# Patient Record
Sex: FEMALE | Race: WHITE | NOT HISPANIC OR LATINO
[De-identification: names, ages, dates, MRNs, and addresses within clinical notes are randomized per-mention and may not be internally consistent; named-entity substitution may affect disease eponyms.]

---

## 2017-08-14 ENCOUNTER — APPOINTMENT (OUTPATIENT)
Dept: SURGERY | Facility: CLINIC | Age: 47
End: 2017-08-14
Payer: COMMERCIAL

## 2017-08-14 PROCEDURE — 99205K: CUSTOM

## 2017-08-16 ENCOUNTER — OUTPATIENT (OUTPATIENT)
Dept: OUTPATIENT SERVICES | Facility: HOSPITAL | Age: 47
LOS: 1 days | End: 2017-08-16
Payer: COMMERCIAL

## 2017-08-16 ENCOUNTER — RESULT REVIEW (OUTPATIENT)
Age: 47
End: 2017-08-16

## 2017-08-16 DIAGNOSIS — Z98.89 OTHER SPECIFIED POSTPROCEDURAL STATES: Chronic | ICD-10-CM

## 2017-08-16 DIAGNOSIS — N92.6 IRREGULAR MENSTRUATION, UNSPECIFIED: Chronic | ICD-10-CM

## 2017-08-16 PROCEDURE — 88321 CONSLTJ&REPRT SLD PREP ELSWR: CPT

## 2017-08-17 ENCOUNTER — OUTPATIENT (OUTPATIENT)
Dept: OUTPATIENT SERVICES | Facility: HOSPITAL | Age: 47
LOS: 1 days | End: 2017-08-17
Payer: COMMERCIAL

## 2017-08-17 ENCOUNTER — APPOINTMENT (OUTPATIENT)
Dept: ULTRASOUND IMAGING | Facility: CLINIC | Age: 47
End: 2017-08-17

## 2017-08-17 ENCOUNTER — APPOINTMENT (OUTPATIENT)
Dept: MRI IMAGING | Facility: CLINIC | Age: 47
End: 2017-08-17

## 2017-08-17 ENCOUNTER — APPOINTMENT (OUTPATIENT)
Dept: MAMMOGRAPHY | Facility: CLINIC | Age: 47
End: 2017-08-17

## 2017-08-17 ENCOUNTER — RESULT REVIEW (OUTPATIENT)
Age: 47
End: 2017-08-17

## 2017-08-17 DIAGNOSIS — N92.6 IRREGULAR MENSTRUATION, UNSPECIFIED: Chronic | ICD-10-CM

## 2017-08-17 DIAGNOSIS — Z98.89 OTHER SPECIFIED POSTPROCEDURAL STATES: Chronic | ICD-10-CM

## 2017-08-17 DIAGNOSIS — Z00.8 ENCOUNTER FOR OTHER GENERAL EXAMINATION: ICD-10-CM

## 2017-08-17 PROCEDURE — 76642 ULTRASOUND BREAST LIMITED: CPT

## 2017-08-17 PROCEDURE — G0279: CPT | Mod: 26

## 2017-08-17 PROCEDURE — 77065 DX MAMMO INCL CAD UNI: CPT

## 2017-08-17 PROCEDURE — G0204: CPT | Mod: 26

## 2017-08-17 PROCEDURE — 76642 ULTRASOUND BREAST LIMITED: CPT | Mod: 26,RT

## 2017-08-17 PROCEDURE — A9585: CPT

## 2017-08-17 PROCEDURE — 19085 BX BREAST 1ST LESION MR IMAG: CPT | Mod: LT

## 2017-08-17 PROCEDURE — 77066 DX MAMMO INCL CAD BI: CPT

## 2017-08-17 PROCEDURE — G0279: CPT

## 2017-08-17 PROCEDURE — A4648: CPT

## 2017-08-17 PROCEDURE — 19085 BX BREAST 1ST LESION MR IMAG: CPT

## 2017-08-21 LAB — SURGICAL PATHOLOGY STUDY: SIGNIFICANT CHANGE UP

## 2017-08-23 DIAGNOSIS — N64.89 OTHER SPECIFIED DISORDERS OF BREAST: ICD-10-CM

## 2017-08-23 DIAGNOSIS — D05.11 INTRADUCTAL CARCINOMA IN SITU OF RIGHT BREAST: ICD-10-CM

## 2017-08-23 DIAGNOSIS — R92.8 OTHER ABNORMAL AND INCONCLUSIVE FINDINGS ON DIAGNOSTIC IMAGING OF BREAST: ICD-10-CM

## 2017-09-14 ENCOUNTER — APPOINTMENT (OUTPATIENT)
Dept: PLASTIC SURGERY | Facility: CLINIC | Age: 47
End: 2017-09-14
Payer: COMMERCIAL

## 2017-09-14 VITALS
RESPIRATION RATE: 16 BRPM | BODY MASS INDEX: 21.19 KG/M2 | TEMPERATURE: 97.8 F | OXYGEN SATURATION: 100 % | WEIGHT: 135 LBS | HEIGHT: 67 IN

## 2017-09-14 DIAGNOSIS — I48.91 UNSPECIFIED ATRIAL FIBRILLATION: ICD-10-CM

## 2017-09-14 DIAGNOSIS — D05.11 INTRADUCTAL CARCINOMA IN SITU OF RIGHT BREAST: ICD-10-CM

## 2017-09-14 DIAGNOSIS — Z86.018 PERSONAL HISTORY OF OTHER BENIGN NEOPLASM: ICD-10-CM

## 2017-09-14 PROCEDURE — 99024 POSTOP FOLLOW-UP VISIT: CPT

## 2017-09-14 PROCEDURE — 99245 OFF/OP CONSLTJ NEW/EST HI 55: CPT

## 2017-09-19 ENCOUNTER — OUTPATIENT (OUTPATIENT)
Dept: OUTPATIENT SERVICES | Facility: HOSPITAL | Age: 47
LOS: 1 days | End: 2017-09-19
Payer: COMMERCIAL

## 2017-09-19 VITALS
DIASTOLIC BLOOD PRESSURE: 60 MMHG | HEART RATE: 56 BPM | HEIGHT: 66.75 IN | SYSTOLIC BLOOD PRESSURE: 100 MMHG | WEIGHT: 138.89 LBS | TEMPERATURE: 97 F | RESPIRATION RATE: 14 BRPM

## 2017-09-19 DIAGNOSIS — N92.6 IRREGULAR MENSTRUATION, UNSPECIFIED: Chronic | ICD-10-CM

## 2017-09-19 DIAGNOSIS — Z98.89 OTHER SPECIFIED POSTPROCEDURAL STATES: Chronic | ICD-10-CM

## 2017-09-19 DIAGNOSIS — Z01.818 ENCOUNTER FOR OTHER PREPROCEDURAL EXAMINATION: ICD-10-CM

## 2017-09-19 DIAGNOSIS — D05.90 UNSPECIFIED TYPE OF CARCINOMA IN SITU OF UNSPECIFIED BREAST: ICD-10-CM

## 2017-09-19 DIAGNOSIS — I48.91 UNSPECIFIED ATRIAL FIBRILLATION: ICD-10-CM

## 2017-09-19 DIAGNOSIS — Z90.710 ACQUIRED ABSENCE OF BOTH CERVIX AND UTERUS: Chronic | ICD-10-CM

## 2017-09-19 LAB
ALBUMIN SERPL ELPH-MCNC: 4.3 G/DL — SIGNIFICANT CHANGE UP (ref 3.3–5)
ALP SERPL-CCNC: 30 U/L — LOW (ref 40–120)
ALT FLD-CCNC: 16 U/L — SIGNIFICANT CHANGE UP (ref 4–33)
AST SERPL-CCNC: 19 U/L — SIGNIFICANT CHANGE UP (ref 4–32)
BILIRUB SERPL-MCNC: 0.3 MG/DL — SIGNIFICANT CHANGE UP (ref 0.2–1.2)
BLD GP AB SCN SERPL QL: NEGATIVE — SIGNIFICANT CHANGE UP
BUN SERPL-MCNC: 16 MG/DL — SIGNIFICANT CHANGE UP (ref 7–23)
CALCIUM SERPL-MCNC: 9.4 MG/DL — SIGNIFICANT CHANGE UP (ref 8.4–10.5)
CHLORIDE SERPL-SCNC: 102 MMOL/L — SIGNIFICANT CHANGE UP (ref 98–107)
CO2 SERPL-SCNC: 25 MMOL/L — SIGNIFICANT CHANGE UP (ref 22–31)
CREAT SERPL-MCNC: 0.56 MG/DL — SIGNIFICANT CHANGE UP (ref 0.5–1.3)
GLUCOSE SERPL-MCNC: 83 MG/DL — SIGNIFICANT CHANGE UP (ref 70–99)
HCT VFR BLD CALC: 39.1 % — SIGNIFICANT CHANGE UP (ref 34.5–45)
HGB BLD-MCNC: 12.8 G/DL — SIGNIFICANT CHANGE UP (ref 11.5–15.5)
MCHC RBC-ENTMCNC: 31.6 PG — SIGNIFICANT CHANGE UP (ref 27–34)
MCHC RBC-ENTMCNC: 32.7 % — SIGNIFICANT CHANGE UP (ref 32–36)
MCV RBC AUTO: 96.5 FL — SIGNIFICANT CHANGE UP (ref 80–100)
NRBC # FLD: 0 — SIGNIFICANT CHANGE UP
PLATELET # BLD AUTO: 221 K/UL — SIGNIFICANT CHANGE UP (ref 150–400)
PMV BLD: 11 FL — SIGNIFICANT CHANGE UP (ref 7–13)
POTASSIUM SERPL-MCNC: 4.6 MMOL/L — SIGNIFICANT CHANGE UP (ref 3.5–5.3)
POTASSIUM SERPL-SCNC: 4.6 MMOL/L — SIGNIFICANT CHANGE UP (ref 3.5–5.3)
PROT SERPL-MCNC: 7.3 G/DL — SIGNIFICANT CHANGE UP (ref 6–8.3)
RBC # BLD: 4.05 M/UL — SIGNIFICANT CHANGE UP (ref 3.8–5.2)
RBC # FLD: 12.7 % — SIGNIFICANT CHANGE UP (ref 10.3–14.5)
RH IG SCN BLD-IMP: POSITIVE — SIGNIFICANT CHANGE UP
SODIUM SERPL-SCNC: 141 MMOL/L — SIGNIFICANT CHANGE UP (ref 135–145)
WBC # BLD: 5.73 K/UL — SIGNIFICANT CHANGE UP (ref 3.8–10.5)
WBC # FLD AUTO: 5.73 K/UL — SIGNIFICANT CHANGE UP (ref 3.8–10.5)

## 2017-09-19 PROCEDURE — 71020: CPT | Mod: 26

## 2017-09-19 NOTE — H&P PST ADULT - PROBLEM SELECTOR PLAN 1
Pt is scheduled for bilateral mastectomy sentinel lymph node biopsy, possible dissections, bilateral muscle flap, bilateral axillary closure and bilateral alloderm for 10/3/17. Preop instructions, surgical scrub and Pepcid provided. Pt stated understanding.

## 2017-09-19 NOTE — H&P PST ADULT - NEGATIVE CARDIOVASCULAR SYMPTOMS
no dyspnea on exertion/no chest pain/no peripheral edema/no paroxysmal nocturnal dyspnea/no claudication/no palpitations/no orthopnea

## 2017-09-19 NOTE — H&P PST ADULT - NEGATIVE GENERAL GENITOURINARY SYMPTOMS
no dysuria/no incontinence/no hematuria/no renal colic/no urinary hesitancy/no flank pain L/normal urinary frequency/no flank pain R/no nocturia/no bladder infections

## 2017-09-19 NOTE — H&P PST ADULT - NEGATIVE GENERAL SYMPTOMS
no anorexia/no chills/no fever/no malaise/no polyphagia/no polyuria/no weight loss/no sweating/no polydipsia/no fatigue/no weight gain

## 2017-09-19 NOTE — H&P PST ADULT - NSANTHOSAYNRD_GEN_A_CORE
No. DIANNA screening performed.  STOP BANG Legend: 0-2 = LOW Risk; 3-4 = INTERMEDIATE Risk; 5-8 = HIGH Risk

## 2017-09-19 NOTE — H&P PST ADULT - NEGATIVE OPHTHALMOLOGIC SYMPTOMS
no blurred vision R/no discharge L/no diplopia/no pain L/no loss of vision R/no blurred vision L/no discharge R/no pain R/no loss of vision L

## 2017-09-19 NOTE — H&P PST ADULT - NEGATIVE NEUROLOGICAL SYMPTOMS
no confusion/no headache/no tremors/no difficulty walking/no loss of sensation/no facial palsy/no focal seizures/no generalized seizures/no paresthesias/no transient paralysis/no vertigo/no weakness/no syncope

## 2017-09-19 NOTE — H&P PST ADULT - MUSCULOSKELETAL
details… detailed exam no joint warmth/ROM intact/normal strength/no joint swelling/no calf tenderness/no joint erythema

## 2017-09-19 NOTE — H&P PST ADULT - NEGATIVE ENMT SYMPTOMS
no throat pain/no dysphagia/no ear pain/no tinnitus/no nasal congestion/no nasal discharge/no vertigo/no nasal obstruction/no post-nasal discharge/no hearing difficulty/no nose bleeds/no sinus symptoms

## 2017-09-19 NOTE — H&P PST ADULT - PMH
Afib  paroxismal A fib  Intramural leiomyoma of uterus    Unspecified type of carcinoma in situ of unspecified breast

## 2017-09-19 NOTE — H&P PST ADULT - PSH
H/O prior ablation treatment  cardiac ablation 2005  H/O: hysterectomy  partial 2015  Irregular uterine bleeding  uterine cauterization 2011  S/P  section  2008

## 2017-09-19 NOTE — H&P PST ADULT - HISTORY OF PRESENT ILLNESS
47 year old female presents to presurgical testing with diagnosis of unspecified type of carcinoma in situ of unspecified breast scheduled for bilateral mastectomy sentinel lymph node biopsy, possible dissections, bilateral muscle flap, bilateral axillary closure and bilateral alloderm for 10/3/17. Pt reports abnormal routine mammogram which lead to further testing. S/p abnormal sonogram followed by bilateral breast biopsy. Decided to go forward with bilateral mastectomies. Pt denies breast lumps, pain, or nipple discharge.

## 2017-09-19 NOTE — H&P PST ADULT - RS GEN PE MLT RESP DETAILS PC
no chest wall tenderness/respirations non-labored/no rhonchi/no subcutaneous emphysema/no intercostal retractions/no rales/clear to auscultation bilaterally/breath sounds equal/airway patent/no wheezes/good air movement

## 2017-10-03 ENCOUNTER — RESULT REVIEW (OUTPATIENT)
Age: 47
End: 2017-10-03

## 2017-10-03 ENCOUNTER — APPOINTMENT (OUTPATIENT)
Dept: SURGERY | Facility: HOSPITAL | Age: 47
End: 2017-10-03

## 2017-10-03 ENCOUNTER — INPATIENT (INPATIENT)
Facility: HOSPITAL | Age: 47
LOS: 0 days | Discharge: HOME | End: 2017-10-04
Attending: SURGERY | Admitting: SURGERY
Payer: COMMERCIAL

## 2017-10-03 ENCOUNTER — APPOINTMENT (OUTPATIENT)
Dept: NUCLEAR MEDICINE | Facility: HOSPITAL | Age: 47
End: 2017-10-03

## 2017-10-03 VITALS
TEMPERATURE: 98 F | OXYGEN SATURATION: 98 % | HEIGHT: 66.75 IN | DIASTOLIC BLOOD PRESSURE: 55 MMHG | WEIGHT: 138.89 LBS | HEART RATE: 57 BPM | RESPIRATION RATE: 16 BRPM | SYSTOLIC BLOOD PRESSURE: 116 MMHG

## 2017-10-03 DIAGNOSIS — Z98.89 OTHER SPECIFIED POSTPROCEDURAL STATES: Chronic | ICD-10-CM

## 2017-10-03 DIAGNOSIS — D05.90 UNSPECIFIED TYPE OF CARCINOMA IN SITU OF UNSPECIFIED BREAST: ICD-10-CM

## 2017-10-03 DIAGNOSIS — Z90.710 ACQUIRED ABSENCE OF BOTH CERVIX AND UTERUS: Chronic | ICD-10-CM

## 2017-10-03 DIAGNOSIS — N92.6 IRREGULAR MENSTRUATION, UNSPECIFIED: Chronic | ICD-10-CM

## 2017-10-03 PROCEDURE — 38525K: CUSTOM | Mod: 50

## 2017-10-03 PROCEDURE — 12034 INTMD RPR S/TR/EXT 7.6-12.5: CPT | Mod: 59

## 2017-10-03 PROCEDURE — 19303K: CUSTOM | Mod: 50

## 2017-10-03 PROCEDURE — 15734 MUSCLE-SKIN GRAFT TRUNK: CPT | Mod: 59,RT

## 2017-10-03 PROCEDURE — 38792K: CUSTOM | Mod: 50

## 2017-10-03 PROCEDURE — 15777 ACELLULAR DERM MATRIX IMPLT: CPT | Mod: LT

## 2017-10-03 PROCEDURE — 88307 TISSUE EXAM BY PATHOLOGIST: CPT | Mod: 26

## 2017-10-03 PROCEDURE — 19357 TISS XPNDR PLMT BRST RCNSTJ: CPT | Mod: RT

## 2017-10-03 PROCEDURE — 88331 PATH CONSLTJ SURG 1 BLK 1SPC: CPT | Mod: 26

## 2017-10-03 RX ORDER — ACETAMINOPHEN 500 MG
650 TABLET ORAL EVERY 6 HOURS
Qty: 0 | Refills: 0 | Status: DISCONTINUED | OUTPATIENT
Start: 2017-10-03 | End: 2017-10-04

## 2017-10-03 RX ORDER — ONDANSETRON 8 MG/1
4 TABLET, FILM COATED ORAL EVERY 6 HOURS
Qty: 0 | Refills: 0 | Status: DISCONTINUED | OUTPATIENT
Start: 2017-10-03 | End: 2017-10-04

## 2017-10-03 RX ORDER — ONDANSETRON 8 MG/1
4 TABLET, FILM COATED ORAL
Qty: 0 | Refills: 0 | Status: DISCONTINUED | OUTPATIENT
Start: 2017-10-03 | End: 2017-10-03

## 2017-10-03 RX ORDER — HYDROMORPHONE HYDROCHLORIDE 2 MG/ML
0.5 INJECTION INTRAMUSCULAR; INTRAVENOUS; SUBCUTANEOUS
Qty: 0 | Refills: 0 | Status: DISCONTINUED | OUTPATIENT
Start: 2017-10-03 | End: 2017-10-04

## 2017-10-03 RX ORDER — DIAZEPAM 5 MG
5 TABLET ORAL AT BEDTIME
Qty: 0 | Refills: 0 | Status: DISCONTINUED | OUTPATIENT
Start: 2017-10-03 | End: 2017-10-04

## 2017-10-03 RX ORDER — FENTANYL CITRATE 50 UG/ML
50 INJECTION INTRAVENOUS
Qty: 0 | Refills: 0 | Status: DISCONTINUED | OUTPATIENT
Start: 2017-10-03 | End: 2017-10-03

## 2017-10-03 RX ORDER — SODIUM CHLORIDE 9 MG/ML
1000 INJECTION, SOLUTION INTRAVENOUS
Qty: 0 | Refills: 0 | Status: DISCONTINUED | OUTPATIENT
Start: 2017-10-03 | End: 2017-10-03

## 2017-10-03 RX ORDER — SOTALOL HCL 120 MG
160 TABLET ORAL
Qty: 0 | Refills: 0 | Status: DISCONTINUED | OUTPATIENT
Start: 2017-10-03 | End: 2017-10-04

## 2017-10-03 RX ORDER — ACETAMINOPHEN 500 MG
650 TABLET ORAL EVERY 6 HOURS
Qty: 0 | Refills: 0 | Status: DISCONTINUED | OUTPATIENT
Start: 2017-10-03 | End: 2017-10-03

## 2017-10-03 RX ORDER — SODIUM CHLORIDE 9 MG/ML
1000 INJECTION INTRAMUSCULAR; INTRAVENOUS; SUBCUTANEOUS
Qty: 0 | Refills: 0 | Status: DISCONTINUED | OUTPATIENT
Start: 2017-10-03 | End: 2017-10-04

## 2017-10-03 RX ORDER — SOTALOL HCL 120 MG
80 TABLET ORAL
Qty: 0 | Refills: 0 | Status: DISCONTINUED | OUTPATIENT
Start: 2017-10-03 | End: 2017-10-04

## 2017-10-03 RX ORDER — DOCUSATE SODIUM 100 MG
100 CAPSULE ORAL
Qty: 0 | Refills: 0 | Status: DISCONTINUED | OUTPATIENT
Start: 2017-10-03 | End: 2017-10-04

## 2017-10-03 RX ORDER — OXYCODONE HYDROCHLORIDE 5 MG/1
5 TABLET ORAL EVERY 6 HOURS
Qty: 0 | Refills: 0 | Status: DISCONTINUED | OUTPATIENT
Start: 2017-10-03 | End: 2017-10-04

## 2017-10-03 RX ORDER — SODIUM CHLORIDE 9 MG/ML
1 INJECTION INTRAMUSCULAR; INTRAVENOUS; SUBCUTANEOUS
Qty: 0 | Refills: 0 | Status: DISCONTINUED | OUTPATIENT
Start: 2017-10-03 | End: 2017-10-04

## 2017-10-03 RX ORDER — CEFAZOLIN SODIUM 1 G
1000 VIAL (EA) INJECTION EVERY 8 HOURS
Qty: 0 | Refills: 0 | Status: DISCONTINUED | OUTPATIENT
Start: 2017-10-03 | End: 2017-10-04

## 2017-10-03 RX ADMIN — SODIUM CHLORIDE 100 MILLILITER(S): 9 INJECTION INTRAMUSCULAR; INTRAVENOUS; SUBCUTANEOUS at 13:00

## 2017-10-03 RX ADMIN — SODIUM CHLORIDE 1 GRAM(S): 9 INJECTION INTRAMUSCULAR; INTRAVENOUS; SUBCUTANEOUS at 18:02

## 2017-10-03 RX ADMIN — Medication 100 MILLIGRAM(S): at 18:02

## 2017-10-03 RX ADMIN — Medication 80 MILLIGRAM(S): at 21:56

## 2017-10-03 RX ADMIN — Medication 650 MILLIGRAM(S): at 18:02

## 2017-10-03 RX ADMIN — Medication 5 MILLIGRAM(S): at 20:38

## 2017-10-03 RX ADMIN — Medication 100 MILLIGRAM(S): at 21:56

## 2017-10-03 NOTE — BRIEF OPERATIVE NOTE - POST-OP DX
Malignant neoplasm of female breast, unspecified estrogen receptor status, unspecified laterality, unspecified site of breast  10/03/2017    Active  Luigi Peterson

## 2017-10-03 NOTE — BRIEF OPERATIVE NOTE - PROCEDURE
<<-----Click on this checkbox to enter Procedure Breast reconstruction  10/03/2017  Bilateral tissue expanders. R - 350ml, L - 300ml. Submuscular with alloderm on the left only.  Active  VVASIL

## 2017-10-03 NOTE — PROGRESS NOTE ADULT - SUBJECTIVE AND OBJECTIVE BOX
D Team Surgery Post Op Note     SUBJECTIVE:    Pt is a 47 year old female seen s/p HASEEB masectomies, tissue expanders bilaterally with reconstruction (submuscular w/ alloderm on left)    SOB:  [ ] YES [ ] NO  Chest Discomfort: [ ] YES [ ] NO    Nausea: [ ] YES [ ] NO           Vomiting: [ ] YES [ ] NO  Flatus: [ ] YES [ ] NO             Bowel Movement: [ ] YES [ ] NO  Diarrhea: [ ] YES [ ] NO         Void: [ ]YES [ ]No  Constipation: [ ] YES [ ] NO     Pain (0-10):              Pain Control Adequate: [ ] YES [ ] NO    Vital Signs Last 24 Hrs  T(C): 36.3 (03 Oct 2017 15:58), Max: 37 (03 Oct 2017 12:35)  T(F): 97.3 (03 Oct 2017 15:58), Max: 98.6 (03 Oct 2017 12:35)  HR: 67 (03 Oct 2017 15:58) (52 - 110)  BP: 111/60 (03 Oct 2017 15:58) (91/47 - 116/55)  BP(mean): --  RR: 15 (03 Oct 2017 15:58) (10 - 20)  SpO2: 99% (03 Oct 2017 15:58) (95% - 100%)  [place]:  NGT:  I&O's Summary    03 Oct 2017 07:01  -  03 Oct 2017 16:19  --------------------------------------------------------  IN: 440 mL / OUT: 75 mL / NET: 365 mL      I&O's Detail    03 Oct 2017 07:01  -  03 Oct 2017 16:19  --------------------------------------------------------  IN:    Oral Fluid: 240 mL    sodium chloride 0.9%.: 200 mL  Total IN: 440 mL    OUT:    Bulb: 50 mL    Bulb: 25 mL  Total OUT: 75 mL    Total NET: 365 mL          PHYSICAL EXAM:  Constitutional: Patient well nourish. well developed.  Eyes:  PERRL, EOMI, Conjunctiva clear.  ENMT:  WNL  Neck:  Supple.  Respiratory:  Lungs CTA, B/L, no rales , no wheezing, no rhonchi.  Cardiovascular:  S1, S2, RRR  Gastrointestinal: Abdomen soft, non distended, + BS, non tenderness  Wound: C/D/I  Genitourinary:  Normal.  Rectal:   Extremities:  No edema, no calf tenderrness,  Neurological: AxAxOx3        A/P:    -Advance Diet as tolerated  -Continue IVF until tolerating diet  -continue Ancef   -resume Sotalol for A-fib   -DVT ppx: venodynes   - D Team Surgery Post Op Note     SUBJECTIVE:    Pt is a 47 year old female seen s/p HASEEB masectomies, tissue expanders bilaterally with reconstruction (submuscular w/ alloderm on left)    Pt seen and examined at bedside. Pt reports come nausea now improved without intervention and pain at incisions. HR controlled at  since surgery.   Pt denies any complaints at this time.   Denies chest pain, SOB, abdominal pain, vomiting, headache dizziness, fever, chills.     Vital Signs Last 24 Hrs  T(C): 36.3 (03 Oct 2017 15:58), Max: 37 (03 Oct 2017 12:35)  T(F): 97.3 (03 Oct 2017 15:58), Max: 98.6 (03 Oct 2017 12:35)  HR: 67 (03 Oct 2017 15:58) (52 - 110)  BP: 111/60 (03 Oct 2017 15:58) (91/47 - 116/55)  BP(mean): --  RR: 15 (03 Oct 2017 15:58) (10 - 20)  SpO2: 99% (03 Oct 2017 15:58) (95% - 100%)    I&O's Summary    03 Oct 2017 07:01  -  03 Oct 2017 16:19  --------------------------------------------------------  IN: 440 mL / OUT: 75 mL / NET: 365 mL      I&O's Detail    03 Oct 2017 07:01  -  03 Oct 2017 16:19  --------------------------------------------------------  IN:    Oral Fluid: 240 mL    sodium chloride 0.9%.: 200 mL  Total IN: 440 mL    OUT:    Bulb: 50 mL serosanguinous     Bulb: 25 mL serosanguinous   Total OUT: 75 mL    Total NET: 365 mL    PHYSICAL EXAM:  Constitutional: Patient well nourish. well developed.  Eyes:  PERRL, EOMI, Conjunctiva clear.  ENMT:  WNL  Cardiovascular:  S1, S2, RRR  Chest: HASEEB breast soft, horizontal incisions with steri-strips C/D/I, left breast appears more pale   Gastrointestinal: Abdomen soft, non distended, + BS, non tenderness  Wound: C/D/I  Genitourinary:  Normal.  Rectal:   Extremities:  No edema, no calf tenderness  Neurological: AxAxOx3    A/P:    -Advance Diet as tolerated  -Continue IVF until tolerating diet  -continue Ancef   -resume Sotalol for A-fib   -DVT ppx: venodynes   - D Team Surgery Post Op Note     SUBJECTIVE:    Pt is a 47 year old female seen s/p HASEEB masectomies, tissue expanders bilaterally with reconstruction (submuscular w/ alloderm on left)    Pt seen and examined at bedside. Pt reports come nausea now improved without intervention and pain at incisions. HR controlled at  since surgery.   Pt denies any complaints at this time.   Denies chest pain, SOB, abdominal pain, vomiting, headache dizziness, fever, chills.     Vital Signs Last 24 Hrs  T(C): 36.3 (03 Oct 2017 15:58), Max: 37 (03 Oct 2017 12:35)  T(F): 97.3 (03 Oct 2017 15:58), Max: 98.6 (03 Oct 2017 12:35)  HR: 67 (03 Oct 2017 15:58) (52 - 110)  BP: 111/60 (03 Oct 2017 15:58) (91/47 - 116/55)  BP(mean): --  RR: 15 (03 Oct 2017 15:58) (10 - 20)  SpO2: 99% (03 Oct 2017 15:58) (95% - 100%)    I&O's Summary    03 Oct 2017 07:01  -  03 Oct 2017 16:19  --------------------------------------------------------  IN: 440 mL / OUT: 75 mL / NET: 365 mL      I&O's Detail    03 Oct 2017 07:01  -  03 Oct 2017 16:19  --------------------------------------------------------  IN:    Oral Fluid: 240 mL    sodium chloride 0.9%.: 200 mL  Total IN: 440 mL    OUT:    Bulb: 50 mL serosanguinous     Bulb: 25 mL serosanguinous   Total OUT: 75 mL    Total NET: 365 mL    PHYSICAL EXAM:  Constitutional: Patient well nourish. well developed.  Eyes:  PERRL, EOMI, Conjunctiva clear.  ENMT:  WNL  Cardiovascular:  S1, S2, RRR  Chest: HASEEB breast soft, horizontal incisions with steri-strips C/D/I, left breast appears more pale with mild ecchymosis. appropriate tenderness to palpation. No axillary or incisional hematomas noted. HASEEB axilla soft nttp   Gastrointestinal: Abdomen soft, non distended, + BS, non tenderness  Extremities:  No edema, no calf tenderness    A/P:    Pt is a 47 year old female seen s/p HASEEB masectomies, tissue expanders bilaterally with reconstruction (submuscular w/ alloderm on left)    -Advance Diet as tolerated  -Continue IVF until tolerating diet  -continue Ancef   -resume Sotalol for A-fib   -DVT ppx: venodynes   -d/c plan for tomorrow   -Case discussed with plastic surgery

## 2017-10-03 NOTE — PATIENT PROFILE ADULT. - PSH
H/O prior ablation treatment  cardiac ablation 2005  Irregular uterine bleeding  uterine cauterization 2011  S/P  section  2008

## 2017-10-04 ENCOUNTER — TRANSCRIPTION ENCOUNTER (OUTPATIENT)
Age: 47
End: 2017-10-04

## 2017-10-04 VITALS
RESPIRATION RATE: 18 BRPM | TEMPERATURE: 98 F | SYSTOLIC BLOOD PRESSURE: 90 MMHG | OXYGEN SATURATION: 95 % | HEART RATE: 77 BPM | DIASTOLIC BLOOD PRESSURE: 49 MMHG

## 2017-10-04 RX ORDER — DOCUSATE SODIUM 100 MG
1 CAPSULE ORAL
Qty: 14 | Refills: 0 | OUTPATIENT
Start: 2017-10-04 | End: 2017-10-11

## 2017-10-04 RX ORDER — AZTREONAM 2 G
1 VIAL (EA) INJECTION
Qty: 14 | Refills: 0 | OUTPATIENT
Start: 2017-10-04 | End: 2017-10-11

## 2017-10-04 RX ORDER — ACETAMINOPHEN 500 MG
1000 TABLET ORAL ONCE
Qty: 0 | Refills: 0 | Status: COMPLETED | OUTPATIENT
Start: 2017-10-04 | End: 2017-10-04

## 2017-10-04 RX ORDER — SODIUM CHLORIDE 9 MG/ML
3 INJECTION INTRAMUSCULAR; INTRAVENOUS; SUBCUTANEOUS EVERY 8 HOURS
Qty: 0 | Refills: 0 | Status: DISCONTINUED | OUTPATIENT
Start: 2017-10-04 | End: 2017-10-04

## 2017-10-04 RX ORDER — DIAZEPAM 5 MG
1 TABLET ORAL
Qty: 24 | Refills: 0 | OUTPATIENT
Start: 2017-10-04 | End: 2017-10-10

## 2017-10-04 RX ADMIN — Medication 650 MILLIGRAM(S): at 06:04

## 2017-10-04 RX ADMIN — Medication 400 MILLIGRAM(S): at 00:12

## 2017-10-04 RX ADMIN — SODIUM CHLORIDE 3 MILLILITER(S): 9 INJECTION INTRAMUSCULAR; INTRAVENOUS; SUBCUTANEOUS at 14:10

## 2017-10-04 RX ADMIN — Medication 160 MILLIGRAM(S): at 09:16

## 2017-10-04 RX ADMIN — Medication 100 MILLIGRAM(S): at 06:03

## 2017-10-04 RX ADMIN — Medication 1000 MILLIGRAM(S): at 00:27

## 2017-10-04 RX ADMIN — Medication 650 MILLIGRAM(S): at 14:09

## 2017-10-04 RX ADMIN — Medication 5 MILLIGRAM(S): at 09:15

## 2017-10-04 RX ADMIN — Medication 100 MILLIGRAM(S): at 14:10

## 2017-10-04 RX ADMIN — Medication 650 MILLIGRAM(S): at 07:04

## 2017-10-04 NOTE — DISCHARGE NOTE ADULT - MEDICATION SUMMARY - MEDICATIONS TO TAKE
I will START or STAY ON the medications listed below when I get home from the hospital:    oxyCODONE-acetaminophen 5 mg-325 mg oral tablet  -- 1 tab(s) by mouth every 4 hours, As Needed -for severe pain MDD:6  -- Caution federal law prohibits the transfer of this drug to any person other  than the person for whom it was prescribed.  May cause drowsiness.  Alcohol may intensify this effect.  Use care when operating dangerous machinery.  This prescription cannot be refilled.  This product contains acetaminophen.  Do not use  with any other product containing acetaminophen to prevent possible liver damage.  Using more of this medication than prescribed may cause serious breathing problems.    -- Indication: For pain    aspirin 325 mg oral tablet  -- 1 tab(s) by mouth once a day (at bedtime)  last dose 9/26/17  -- Indication: For anticoagulation    magnesium hydroxide  -- 500 milligram(s) by mouth once a day (at bedtime)  -- Indication: For reflux    sotalol 80 mg oral tablet  -- 2 tab(s) by mouth once a day in am  -- Indication: For Hypertension    sotalol 80 mg oral tablet  -- 1 tab(s) by mouth once a day (at bedtime)  -- Indication: For Hypertension    diazePAM 5 mg oral tablet  -- 1 tab(s) by mouth every 6 hours, As Needed -for muscle spasm MDD:4  -- Caution federal law prohibits the transfer of this drug to any person other  than the person for whom it was prescribed.  Do not take this drug if you are pregnant.  May cause drowsiness.  Alcohol may intensify this effect.  Use care when operating dangerous machinery.    -- Indication: For muscle spasm    Primrose Oil oral capsule  -- 1 tab(s) by mouth once a day (at bedtime)  last dose 9/26/17  -- Indication: For supplement    Colace 100 mg oral capsule  -- 1 cap(s) by mouth 2 times a day, As Needed -for constipation   -- Indication: For Constipation    Sodium Chloride 1 g oral tablet  -- 1 tab(s) by mouth 2 times a day  -- Indication: For supplement    Bactrim  mg-160 mg oral tablet  -- 1 tab(s) by mouth every 12 hours   -- Avoid prolonged or excessive exposure to direct and/or artificial sunlight while taking this medication.  Finish all this medication unless otherwise directed by prescriber.  Medication should be taken with plenty of water.    -- Indication: For infection    Fish Oil 1200 mg oral capsule  -- 1 cap(s) by mouth once a day  last dose 9/26/17  -- Indication: For supplement    Multiple Vitamins oral tablet  -- 1 tab(s) by mouth once a day  last dose 9/26/17  -- Indication: For supplement    Vitamin B6 100 mg oral tablet  -- 1 tab(s) by mouth once a day  last dose 9/26/17  -- Indication: For supplement    Vitamin B12 1000 mcg oral tablet  -- 1 tab(s) by mouth once a day  last dose 9/26/17  -- Indication: For supplement    Vitamin D3 1000 intl units oral capsule  -- 1 cap(s) by mouth once a day  -- Indication: For supplement I will START or STAY ON the medications listed below when I get home from the hospital:    oxyCODONE-acetaminophen 5 mg-325 mg oral tablet  -- 1 tab(s) by mouth every 4 hours, As Needed -for severe pain MDD:6  -- Caution federal law prohibits the transfer of this drug to any person other  than the person for whom it was prescribed.  May cause drowsiness.  Alcohol may intensify this effect.  Use care when operating dangerous machinery.  This prescription cannot be refilled.  This product contains acetaminophen.  Do not use  with any other product containing acetaminophen to prevent possible liver damage.  Using more of this medication than prescribed may cause serious breathing problems.    -- Indication: For pain    aspirin 325 mg oral tablet  -- 1 tab(s) by mouth once a day (at bedtime)  last dose 9/26/17  -- Indication: For anticoagulation    magnesium hydroxide  -- 500 milligram(s) by mouth once a day (at bedtime)  -- Indication: For reflux    sotalol 80 mg oral tablet  -- 2 tab(s) by mouth once a day in am  -- Indication: For Hypertension    sotalol 80 mg oral tablet  -- 1 tab(s) by mouth once a day (at bedtime)  -- Indication: For Hypertension    diazePAM 5 mg oral tablet  -- 1 tab(s) by mouth every 6 hours, As Needed -for muscle spasm MDD:4  -- Caution federal law prohibits the transfer of this drug to any person other  than the person for whom it was prescribed.  Do not take this drug if you are pregnant.  May cause drowsiness.  Alcohol may intensify this effect.  Use care when operating dangerous machinery.    -- Indication: For muscle spasm    Primrose Oil oral capsule  -- 1 tab(s) by mouth once a day (at bedtime)  last dose 9/26/17  -- Indication: For supplement    Colace 100 mg oral capsule  -- 1 cap(s) by mouth 2 times a day, As Needed -for constipation   -- Indication: For Constipation    clindamycin 150 mg oral capsule  -- 1 cap(s) by mouth every 6 hours  -- Indication: For antibiotic     Sodium Chloride 1 g oral tablet  -- 1 tab(s) by mouth 2 times a day  -- Indication: For supplement    Fish Oil 1200 mg oral capsule  -- 1 cap(s) by mouth once a day  last dose 9/26/17  -- Indication: For supplement    Multiple Vitamins oral tablet  -- 1 tab(s) by mouth once a day  last dose 9/26/17  -- Indication: For supplement    Vitamin B6 100 mg oral tablet  -- 1 tab(s) by mouth once a day  last dose 9/26/17  -- Indication: For supplement    Vitamin B12 1000 mcg oral tablet  -- 1 tab(s) by mouth once a day  last dose 9/26/17  -- Indication: For supplement    Vitamin D3 1000 intl units oral capsule  -- 1 cap(s) by mouth once a day  -- Indication: For supplement

## 2017-10-04 NOTE — PROGRESS NOTE ADULT - ASSESSMENT
A/P: 47 F POD#1      s/p b/l mastectomies and TE placement        - c/w valium  - c/w po pain medications for control  - IS  - Ambulate as tolerated  - DVT prophylaxis   -anticipate discharge home when ambulating, without nausea, and pain controlled with oral medications later today  - send home with valium for muscle relaxation and percocet for pain control.
A/P: 47 F POD#1      s/p b/l mastectomies and TE placement        - c/w valium  - c/w po pain medications for control  - IS  - Ambulate as tolerated  - DVT prophylaxis   -anticipate discharge home when ambulating, without nausea, and pain controlled with oral medications later today  - send home with valium for muscle relaxation and percocet for pain control.

## 2017-10-04 NOTE — DISCHARGE NOTE ADULT - INSTRUCTIONS
Eat well balanced diet ,drink 8-10 glasses of water each day .IF temp above 100.4,bleeding from the surgical site notifypatient is not in any  MD

## 2017-10-04 NOTE — DISCHARGE NOTE ADULT - PATIENT PORTAL LINK FT
“You can access the Follow[employer] Patient Portal, offered by [facility], by registering with the following website: [email or web address]”

## 2017-10-04 NOTE — DISCHARGE NOTE ADULT - CARE PLAN
Principal Discharge DX:	Breast cancer  Goal:	Obtain proper follow up  Instructions for follow-up, activity and diet:	Please follow up in one week with Dr. Fuentes as per plastic surgery. Return to the hospital if you notice swelling, high fevers, or increasing pain. Take pain medication as needed as prescribed. Do not drive while taking pain medications. Principal Discharge DX:	Breast cancer  Goal:	Obtain proper follow up  Instructions for follow-up, activity and diet:	Please follow up in one week with Dr. Fuentes as per plastic surgery. Return to the hospital if you notice swelling, high fevers, or increasing pain. Take pain medication as needed as prescribed. Do not drive while taking pain medications. Empty your KIKI drains and record the output. Bring the record to your follow up appointment.

## 2017-10-04 NOTE — PROGRESS NOTE ADULT - SUBJECTIVE AND OBJECTIVE BOX
Surgery Progress Note    S: Patient seen and examined. No acute events overnight. Pain well controlled with current regimen. Denies nausea/vomiting. Tolerating regular diet.     O:  Vital Signs Last 24 Hrs  T(C): 36.6 (04 Oct 2017 10:28), Max: 37.1 (03 Oct 2017 21:34)  T(F): 97.9 (04 Oct 2017 10:28), Max: 98.7 (03 Oct 2017 21:34)  HR: 77 (04 Oct 2017 10:28) (64 - 82)  BP: 90/49 (04 Oct 2017 10:28) (87/40 - 104/56)  BP(mean): --  RR: 18 (04 Oct 2017 10:28) (16 - 20)  SpO2: 95% (04 Oct 2017 10:28) (95% - 100%)    I&O's Detail    03 Oct 2017 07:01  -  04 Oct 2017 07:00  --------------------------------------------------------  IN:    Oral Fluid: 240 mL    sodium chloride 0.9%: 200 mL  Total IN: 440 mL    OUT:    Bulb: 117.5 mL    Bulb: 55 mL    Emesis: 100 mL    Voided: 1600 mL  Total OUT: 1872.5 mL    Total NET: -1432.5 mL          MEDICATIONS  (STANDING):  ceFAZolin   IVPB 1000 milliGRAM(s) IV Intermittent every 8 hours  docusate sodium 100 milliGRAM(s) Oral two times a day  sodium chloride 1 Gram(s) Oral two times a day  sodium chloride 0.9% lock flush 3 milliLiter(s) IV Push every 8 hours  sotalol 160 milliGRAM(s) Oral <User Schedule>  sotalol 80 milliGRAM(s) Oral <User Schedule>    MEDICATIONS  (PRN):  acetaminophen   Tablet 650 milliGRAM(s) Oral every 6 hours PRN For Temp greater than 38 C (100.4 F)  acetaminophen   Tablet. 650 milliGRAM(s) Oral every 6 hours PRN Mild Pain (1 - 3)  diazepam    Tablet 5 milliGRAM(s) Oral at bedtime PRN muscle spasm and/or anxiety  HYDROmorphone  Injectable 0.5 milliGRAM(s) IV Push every 3 hours PRN Severe Pain (7 - 10)  ondansetron Injectable 4 milliGRAM(s) IV Push every 6 hours PRN Nausea and/or Vomiting  oxyCODONE    IR 5 milliGRAM(s) Oral every 6 hours PRN Moderate Pain (4 - 6)                  Physical Exam:  Gen: Laying in bed, NAD, alert and oriented.   Chest: Incisions c/d/i, drains serosanguinous, breasts ecchymotic without collections  Resp: Unlabored breathing  Abd: soft, NTND

## 2017-10-04 NOTE — DISCHARGE NOTE ADULT - PLAN OF CARE
Obtain proper follow up Please follow up in one week with Dr. Fuentes as per plastic surgery. Return to the hospital if you notice swelling, high fevers, or increasing pain. Take pain medication as needed as prescribed. Do not drive while taking pain medications. Please follow up in one week with Dr. Fuentes as per plastic surgery. Return to the hospital if you notice swelling, high fevers, or increasing pain. Take pain medication as needed as prescribed. Do not drive while taking pain medications. Empty your KIKI drains and record the output. Bring the record to your follow up appointment.

## 2017-10-04 NOTE — DISCHARGE NOTE ADULT - CARE PROVIDER_API CALL
Magui Nascimento (MD), FPPLJ Breast Surgery  [address] [facility]  [address]  [place], [place] [id number]  Phone: [phone]  Fax: [phone]    Shahram Fuentes), Plastic Surgery  [address] [place] [address]  [address]  [place] [address]  Phone: [phone]  Fax: [phone]

## 2017-10-04 NOTE — DISCHARGE NOTE ADULT - HOSPITAL COURSE
47F admitted on 10/3 for scheduled surgery. The patient underwent a bilateral mastectomy, sentinel lymph node biopsy and reconstruction with tissue expanders. The patient tolerated the procedure well and was sent to the PACU and then the floor. The patient remained hemodynamically stable throughout her hospital stay. Her diet was advanced and she tolerated it well. Her pain was well controlled and the patient was ambulating well when discharged home.

## 2017-10-04 NOTE — PROGRESS NOTE ADULT - SUBJECTIVE AND OBJECTIVE BOX
Complaining of nausea and wretching overnight, now feeling better  No palpitations or dyspnea  BP 96/54  HR 82  Afebrile  Lungs clear  RR no murmurs  No edema    Stable from a CV standpoint

## 2017-10-04 NOTE — PROGRESS NOTE ADULT - SUBJECTIVE AND OBJECTIVE BOX
SUBJECTIVE:  Doing well. Nauseated overnight. Pain controlled.    OBJECTIVE:     ** VITAL SIGNS / I&O's **    T(C): 36.8 (10-04-17 @ 06:06), Max: 37.1 (10-03-17 @ 21:34)  HR: 82 (10-04-17 @ 06:06) (52 - 110)  BP: 96/54 (10-04-17 @ 06:06) (87/40 - 111/60)  RR: 20 (10-04-17 @ 06:06) (10 - 20)  SpO2: 97% (10-04-17 @ 06:06) (95% - 100%)  Wt(kg): --  10-03 @ 07:01  -  10-04 @ 07:00  --------------------------------------------------------  IN:    Oral Fluid: 240 mL    sodium chloride 0.9%.: 200 mL  Total IN: 440 mL    OUT:    Bulb: 117.5 mL    Bulb: 55 mL    Emesis: 100 mL    Voided: 1600 mL  Total OUT: 1872.5 mL    Total NET: -1432.5 mL              ** PHYSICAL EXAM **    -- CONSTITUTIONAL: AOx3. NAD.   -- Breast: incisions c/d/i, drains serosanguinous, breasts ecchymotic without collections 4

## 2017-10-04 NOTE — PROVIDER CONTACT NOTE (OTHER) - ASSESSMENT
pt. vomited 100ml of clear fluid, pt. refuses antiemetics, denies nausea @ this time, pt reports "feeling better."

## 2017-10-04 NOTE — PROGRESS NOTE ADULT - SUBJECTIVE AND OBJECTIVE BOX
ANESTHESIA POSTOP CHECK    47y Female POSTOP DAY 1 S/P     Vital Signs Last 24 Hrs  T(C): 36.8 (04 Oct 2017 06:06), Max: 37.1 (03 Oct 2017 21:34)  T(F): 98.2 (04 Oct 2017 06:06), Max: 98.7 (03 Oct 2017 21:34)  HR: 82 (04 Oct 2017 06:06) (52 - 110)  BP: 96/54 (04 Oct 2017 06:06) (87/40 - 111/60)  BP(mean): --  RR: 20 (04 Oct 2017 06:06) (10 - 20)  SpO2: 97% (04 Oct 2017 06:06) (95% - 100%)  I&O's Summary    03 Oct 2017 07:01  -  04 Oct 2017 07:00  --------------------------------------------------------  IN: 440 mL / OUT: 1872.5 mL / NET: -1432.5 mL        [X ] NO APPARENT ANESTHESIA COMPLICATIONS      Comments: Seen at 09:20 am. Sitting up smiling.  Had severe nausea and vomiting at 2 am.  Chatted with patient and her best friend. Very pleasant people.  At times tearful but consolable gracious

## 2017-10-04 NOTE — DISCHARGE NOTE ADULT - CARE PROVIDERS DIRECT ADDRESSES
[email or web address]

## 2017-10-08 LAB — SURGICAL PATHOLOGY STUDY: SIGNIFICANT CHANGE UP

## 2017-10-09 ENCOUNTER — APPOINTMENT (OUTPATIENT)
Dept: SURGERY | Facility: CLINIC | Age: 47
End: 2017-10-09
Payer: COMMERCIAL

## 2017-10-09 PROCEDURE — 99024 POSTOP FOLLOW-UP VISIT: CPT

## 2017-11-06 ENCOUNTER — RESULT REVIEW (OUTPATIENT)
Age: 47
End: 2017-11-06

## 2018-01-03 ENCOUNTER — OUTPATIENT (OUTPATIENT)
Dept: OUTPATIENT SERVICES | Facility: HOSPITAL | Age: 48
LOS: 1 days | End: 2018-01-03
Payer: COMMERCIAL

## 2018-01-03 VITALS
WEIGHT: 139.99 LBS | TEMPERATURE: 98 F | HEIGHT: 67 IN | RESPIRATION RATE: 16 BRPM | SYSTOLIC BLOOD PRESSURE: 108 MMHG | HEART RATE: 60 BPM | DIASTOLIC BLOOD PRESSURE: 64 MMHG

## 2018-01-03 DIAGNOSIS — N92.6 IRREGULAR MENSTRUATION, UNSPECIFIED: Chronic | ICD-10-CM

## 2018-01-03 DIAGNOSIS — Z98.89 OTHER SPECIFIED POSTPROCEDURAL STATES: Chronic | ICD-10-CM

## 2018-01-03 DIAGNOSIS — Z88.0 ALLERGY STATUS TO PENICILLIN: ICD-10-CM

## 2018-01-03 DIAGNOSIS — D05.10 INTRADUCTAL CARCINOMA IN SITU OF UNSPECIFIED BREAST: ICD-10-CM

## 2018-01-03 DIAGNOSIS — C50.919 MALIGNANT NEOPLASM OF UNSPECIFIED SITE OF UNSPECIFIED FEMALE BREAST: ICD-10-CM

## 2018-01-03 DIAGNOSIS — Z90.13 ACQUIRED ABSENCE OF BILATERAL BREASTS AND NIPPLES: Chronic | ICD-10-CM

## 2018-01-03 DIAGNOSIS — I48.91 UNSPECIFIED ATRIAL FIBRILLATION: ICD-10-CM

## 2018-01-03 DIAGNOSIS — Z90.710 ACQUIRED ABSENCE OF BOTH CERVIX AND UTERUS: Chronic | ICD-10-CM

## 2018-01-03 LAB
BLD GP AB SCN SERPL QL: NEGATIVE — SIGNIFICANT CHANGE UP
BUN SERPL-MCNC: 18 MG/DL — SIGNIFICANT CHANGE UP (ref 7–23)
CALCIUM SERPL-MCNC: 9.3 MG/DL — SIGNIFICANT CHANGE UP (ref 8.4–10.5)
CHLORIDE SERPL-SCNC: 101 MMOL/L — SIGNIFICANT CHANGE UP (ref 98–107)
CO2 SERPL-SCNC: 30 MMOL/L — SIGNIFICANT CHANGE UP (ref 22–31)
CREAT SERPL-MCNC: 0.56 MG/DL — SIGNIFICANT CHANGE UP (ref 0.5–1.3)
GLUCOSE SERPL-MCNC: 80 MG/DL — SIGNIFICANT CHANGE UP (ref 70–99)
HCT VFR BLD CALC: 37 % — SIGNIFICANT CHANGE UP (ref 34.5–45)
HGB BLD-MCNC: 12.1 G/DL — SIGNIFICANT CHANGE UP (ref 11.5–15.5)
MCHC RBC-ENTMCNC: 30.7 PG — SIGNIFICANT CHANGE UP (ref 27–34)
MCHC RBC-ENTMCNC: 32.7 % — SIGNIFICANT CHANGE UP (ref 32–36)
MCV RBC AUTO: 93.9 FL — SIGNIFICANT CHANGE UP (ref 80–100)
NRBC # FLD: 0 — SIGNIFICANT CHANGE UP
PLATELET # BLD AUTO: 216 K/UL — SIGNIFICANT CHANGE UP (ref 150–400)
PMV BLD: 10.5 FL — SIGNIFICANT CHANGE UP (ref 7–13)
POTASSIUM SERPL-MCNC: 4.4 MMOL/L — SIGNIFICANT CHANGE UP (ref 3.5–5.3)
POTASSIUM SERPL-SCNC: 4.4 MMOL/L — SIGNIFICANT CHANGE UP (ref 3.5–5.3)
RBC # BLD: 3.94 M/UL — SIGNIFICANT CHANGE UP (ref 3.8–5.2)
RBC # FLD: 12.2 % — SIGNIFICANT CHANGE UP (ref 10.3–14.5)
RH IG SCN BLD-IMP: POSITIVE — SIGNIFICANT CHANGE UP
SODIUM SERPL-SCNC: 142 MMOL/L — SIGNIFICANT CHANGE UP (ref 135–145)
WBC # BLD: 5.53 K/UL — SIGNIFICANT CHANGE UP (ref 3.8–10.5)
WBC # FLD AUTO: 5.53 K/UL — SIGNIFICANT CHANGE UP (ref 3.8–10.5)

## 2018-01-03 PROCEDURE — 93010 ELECTROCARDIOGRAM REPORT: CPT

## 2018-01-03 RX ORDER — PYRIDOXINE HCL (VITAMIN B6) 100 MG
1 TABLET ORAL
Qty: 0 | Refills: 0 | COMMUNITY

## 2018-01-03 RX ORDER — SOTALOL HCL 120 MG
1 TABLET ORAL
Qty: 0 | Refills: 0 | COMMUNITY

## 2018-01-03 RX ORDER — SODIUM CHLORIDE 9 MG/ML
1 INJECTION INTRAMUSCULAR; INTRAVENOUS; SUBCUTANEOUS
Qty: 0 | Refills: 0 | COMMUNITY

## 2018-01-03 RX ORDER — SODIUM CHLORIDE 9 MG/ML
1000 INJECTION, SOLUTION INTRAVENOUS
Qty: 0 | Refills: 0 | Status: DISCONTINUED | OUTPATIENT
Start: 2018-01-16 | End: 2018-01-31

## 2018-01-03 RX ORDER — MAGNESIUM HYDROXIDE 400 MG/1
500 TABLET, CHEWABLE ORAL
Qty: 0 | Refills: 0 | COMMUNITY

## 2018-01-03 RX ORDER — PREGABALIN 225 MG/1
1 CAPSULE ORAL
Qty: 0 | Refills: 0 | COMMUNITY

## 2018-01-03 RX ORDER — CHOLECALCIFEROL (VITAMIN D3) 125 MCG
1 CAPSULE ORAL
Qty: 0 | Refills: 0 | COMMUNITY

## 2018-01-03 RX ORDER — EVENING PRIMROSE OIL 500 MG
1 CAPSULE ORAL
Qty: 0 | Refills: 0 | COMMUNITY

## 2018-01-03 RX ORDER — SOTALOL HCL 120 MG
2 TABLET ORAL
Qty: 0 | Refills: 0 | COMMUNITY

## 2018-01-03 NOTE — H&P PST ADULT - PROBLEM SELECTOR PLAN 1
Pt is scheduled for bilateral revision of breast reconstruction with bilateral exchange for 1/16/18. Preop instructions, pepcid, surgical scrub provided. Pt stated understanding.

## 2018-01-03 NOTE — H&P PST ADULT - NEGATIVE NEUROLOGICAL SYMPTOMS
no paresthesias/no tremors/no vertigo/no loss of sensation/no facial palsy/no weakness/no generalized seizures/no focal seizures/no syncope/no transient paralysis/no difficulty walking/no confusion/no headache

## 2018-01-03 NOTE — H&P PST ADULT - NEGATIVE OPHTHALMOLOGIC SYMPTOMS
gradual onset
no blurred vision R/no discharge R/no pain L/no loss of vision R/no loss of vision L/no diplopia/no blurred vision L/no discharge L/no pain R

## 2018-01-03 NOTE — H&P PST ADULT - RS GEN PE MLT RESP DETAILS PC
respirations non-labored/airway patent/no intercostal retractions/no wheezes/no chest wall tenderness/clear to auscultation bilaterally/no rales/no subcutaneous emphysema/good air movement/no rhonchi/breath sounds equal

## 2018-01-03 NOTE — H&P PST ADULT - NEGATIVE ENMT SYMPTOMS
no ear pain/no tinnitus/no nasal discharge/no vertigo/no sinus symptoms/no hearing difficulty/no nose bleeds/no nasal congestion/no nasal obstruction/no post-nasal discharge/no throat pain/no dysphagia

## 2018-01-03 NOTE — H&P PST ADULT - HISTORY OF PRESENT ILLNESS
47 year old female presents to presurgical testing with diagnosis of malignant neoplasm of unspecified site of unspecified female breasts scheduled for bilateral revision of breast reconstruction with bilateral exchange for 1/16/18. Pt with Hx of Right DCIS, s/p bilateral mastectomy with flap and expander placement in 10/2017.

## 2018-01-03 NOTE — H&P PST ADULT - NEGATIVE GENERAL GENITOURINARY SYMPTOMS
no nocturia/no hematuria/no renal colic/no flank pain L/no dysuria/no incontinence/normal urinary frequency/no flank pain R/no bladder infections/no urinary hesitancy

## 2018-01-03 NOTE — H&P PST ADULT - MUSCULOSKELETAL
detailed exam no joint swelling/normal strength/ROM intact/no calf tenderness/no joint erythema/no joint warmth details…

## 2018-01-03 NOTE — H&P PST ADULT - PMH
Afib  paroxismal A fib  DCIS (ductal carcinoma in situ) of breast  Right  Intramural leiomyoma of uterus

## 2018-01-03 NOTE — H&P PST ADULT - NSANTHOSAYNRD_GEN_A_CORE
No. DIANAN screening performed.  STOP BANG Legend: 0-2 = LOW Risk; 3-4 = INTERMEDIATE Risk; 5-8 = HIGH Risk

## 2018-01-03 NOTE — H&P PST ADULT - PROBLEM SELECTOR PLAN 2
Paroxysmal A Fib. Pending cardiology evaluation with last stress and echo. Last aspirin on 1/8/18. To take sotalol on the morning of procedure.

## 2018-01-03 NOTE — H&P PST ADULT - NEGATIVE CARDIOVASCULAR SYMPTOMS
no claudication/no palpitations/no dyspnea on exertion/no paroxysmal nocturnal dyspnea/no peripheral edema/no chest pain/no orthopnea

## 2018-01-03 NOTE — H&P PST ADULT - PSH
H/O bilateral mastectomy  flap and tissue expander on 10/3/17  H/O prior ablation treatment  cardiac ablation 2005  H/O: hysterectomy  partial, Right salpingo oophorectomy 2015  Irregular uterine bleeding  uterine cauterization 2011  S/P  section  2008

## 2018-01-16 ENCOUNTER — OUTPATIENT (OUTPATIENT)
Dept: OUTPATIENT SERVICES | Facility: HOSPITAL | Age: 48
LOS: 1 days | Discharge: HOME | End: 2018-01-16
Payer: COMMERCIAL

## 2018-01-16 VITALS
DIASTOLIC BLOOD PRESSURE: 52 MMHG | RESPIRATION RATE: 17 BRPM | SYSTOLIC BLOOD PRESSURE: 103 MMHG | HEART RATE: 61 BPM | OXYGEN SATURATION: 98 %

## 2018-01-16 VITALS
WEIGHT: 139.99 LBS | SYSTOLIC BLOOD PRESSURE: 116 MMHG | TEMPERATURE: 98 F | HEART RATE: 60 BPM | OXYGEN SATURATION: 100 % | HEIGHT: 67 IN | RESPIRATION RATE: 16 BRPM | DIASTOLIC BLOOD PRESSURE: 50 MMHG

## 2018-01-16 DIAGNOSIS — Z98.89 OTHER SPECIFIED POSTPROCEDURAL STATES: Chronic | ICD-10-CM

## 2018-01-16 DIAGNOSIS — N92.6 IRREGULAR MENSTRUATION, UNSPECIFIED: Chronic | ICD-10-CM

## 2018-01-16 DIAGNOSIS — Z90.710 ACQUIRED ABSENCE OF BOTH CERVIX AND UTERUS: Chronic | ICD-10-CM

## 2018-01-16 DIAGNOSIS — Z90.13 ACQUIRED ABSENCE OF BILATERAL BREASTS AND NIPPLES: Chronic | ICD-10-CM

## 2018-01-16 DIAGNOSIS — C50.919 MALIGNANT NEOPLASM OF UNSPECIFIED SITE OF UNSPECIFIED FEMALE BREAST: ICD-10-CM

## 2018-01-16 PROCEDURE — 19380 REVJ RECONSTRUCTED BREAST: CPT | Mod: 59,LT

## 2018-01-16 PROCEDURE — 19342 INSJ/RPLCMT BRST IMPLT SEP D: CPT | Mod: 59,RT

## 2018-01-16 PROCEDURE — 14001 TIS TRNFR TRUNK 10.1-30SQCM: CPT | Mod: 59,LT

## 2018-01-16 PROCEDURE — 19371 PERI-IMPLT CAPSLC BRST COMPL: CPT | Mod: 59,LT

## 2018-01-16 PROCEDURE — 15734 MUSCLE-SKIN GRAFT TRUNK: CPT | Mod: 59,RT

## 2018-01-16 RX ORDER — HYDROMORPHONE HYDROCHLORIDE 2 MG/ML
0.5 INJECTION INTRAMUSCULAR; INTRAVENOUS; SUBCUTANEOUS
Qty: 0 | Refills: 0 | Status: DISCONTINUED | OUTPATIENT
Start: 2018-01-16 | End: 2018-01-16

## 2018-01-16 RX ORDER — ONDANSETRON 8 MG/1
4 TABLET, FILM COATED ORAL ONCE
Qty: 0 | Refills: 0 | Status: DISCONTINUED | OUTPATIENT
Start: 2018-01-16 | End: 2018-01-31

## 2018-01-16 RX ADMIN — SODIUM CHLORIDE 30 MILLILITER(S): 9 INJECTION, SOLUTION INTRAVENOUS at 12:20

## 2018-01-16 NOTE — ASU DISCHARGE PLAN (ADULT/PEDIATRIC). - FOLLOWUP APPOINTMENT CLINIC/PHYSICIAN
Please follow up with Dr. Fuentes within x1 week after discharge from the hospital. You may call [phone] to schedule an appointment.

## 2018-01-16 NOTE — BRIEF OPERATIVE NOTE - OPERATION/FINDINGS
Preoperative Diagnosis: h/o bilateral mastectomies, bilateral breast reconstruction with tissue expanders  Procedure: Bilateral TE-Implant exchange; revision of bilateral breast reconstruction  Postoperative Diagnosis: h/o bilateral mastectomies, bilateral breast reconstruction with tissue expanders

## 2018-01-16 NOTE — ASU DISCHARGE PLAN (ADULT/PEDIATRIC). - ASU FOLLOWUP
911 or go to the nearest Emergency Room [facility] (Adult):/may also call Recovery Room (PACU) 24/7 @ [phone]

## 2018-01-16 NOTE — ASU DISCHARGE PLAN (ADULT/PEDIATRIC). - NURSING INSTRUCTIONS
DO NOT take any Tylenol (Acetaminophen) or narcotics containing Tylenol until after 5:00pm. You received Tylenol during your operation and it can cause damage to your liver if too much is taken within a 24 hour time period.    No creams, lotions, powders  or ointments to incision site.     Narcotic pain medication may cause nausea or constipation. Take medication with food. Increase fluids and fiber intake.

## 2018-01-16 NOTE — ASU DISCHARGE PLAN (ADULT/PEDIATRIC). - MEDICATION SUMMARY - MEDICATIONS TO TAKE
I will START or STAY ON the medications listed below when I get home from the hospital:    aspirin 325 mg oral tablet  -- 1 tab(s) by mouth once a day (at bedtime)  last dose1/8/18  -- Indication: For ** Do NOT take until given the OK by Dr. Fuentes **    sotalol 80 mg oral tablet  -- 2 tab(s) by mouth once a day  -- Indication: For Home Medication    sotalol 80 mg oral tablet  -- 1 tab(s) by mouth once a day (at bedtime)  -- Indication: For Home Medication    Primrose Oil oral capsule  -- 1 tab(s) by mouth once a day  -- Indication: For Home Medication    Sodium Chloride 1 g oral tablet  -- 1 tab(s) by mouth 2 times a day  -- Indication: For Home Medication    magnesium carbonate  -- 500 milligram(s) by mouth once a day  -- Indication: For Home Medication    Fish Oil 1200 mg oral capsule  -- 1 cap(s) by mouth once a day    -- Indication: For Home Medication    Multiple Vitamins oral tablet  -- 1 tab(s) by mouth once a day    -- Indication: For Home Medication    Vitamin D3 1000 intl units oral capsule  -- 1 cap(s) by mouth once a day  -- Indication: For Home Medication    Vitamin B6 100 mg oral tablet  -- 1 tab(s) by mouth once a day  -- Indication: For Home Medication    Vitamin B12 1000 mcg oral tablet  -- 1 tab(s) by mouth once a day  -- Indication: For Home Medication

## 2018-01-16 NOTE — BRIEF OPERATIVE NOTE - PROCEDURE
<<-----Click on this checkbox to enter Procedure Revision of reconstruction of both breasts  01/16/2018    Active  [facility]

## 2018-01-16 NOTE — ASU DISCHARGE PLAN (ADULT/PEDIATRIC). - MEDICATION SUMMARY - MEDICATIONS TO STOP TAKING
I will STOP taking the medications listed below when I get home from the hospital:    aspirin 325 mg oral tablet  -- 1 tab(s) by mouth once a day (at bedtime)  last dose1/8/18

## 2018-01-16 NOTE — ASU DISCHARGE PLAN (ADULT/PEDIATRIC). - INSTRUCTIONS
The patient may resume a regular diet. The patient may resume a regular diet. Keep first meal light. Nothing fried, spicy or greasy. Increase fluids.

## 2018-01-16 NOTE — ASU DISCHARGE PLAN (ADULT/PEDIATRIC). - NOTIFY
Fever greater than 101/Inability to Tolerate Liquids or Foods/Bleeding that does not stop/Swelling that continues/Pain not relieved by Medications/Persistent Nausea and Vomiting red hot irritated skin or pussy drainage from incision/Numbness, color, or temperature change to extremity/Unable to Urinate/Bleeding that does not stop/Inability to Tolerate Liquids or Foods/Persistent Nausea and Vomiting/Fever greater than 101/Swelling that continues/Pain not relieved by Medications

## 2018-03-27 ENCOUNTER — OUTPATIENT (OUTPATIENT)
Dept: OUTPATIENT SERVICES | Facility: HOSPITAL | Age: 48
LOS: 1 days | End: 2018-03-27
Payer: COMMERCIAL

## 2018-03-27 VITALS
HEIGHT: 67 IN | WEIGHT: 145.06 LBS | TEMPERATURE: 98 F | HEART RATE: 55 BPM | DIASTOLIC BLOOD PRESSURE: 60 MMHG | RESPIRATION RATE: 14 BRPM | SYSTOLIC BLOOD PRESSURE: 90 MMHG

## 2018-03-27 DIAGNOSIS — D05.10 INTRADUCTAL CARCINOMA IN SITU OF UNSPECIFIED BREAST: ICD-10-CM

## 2018-03-27 DIAGNOSIS — Z90.13 ACQUIRED ABSENCE OF BILATERAL BREASTS AND NIPPLES: Chronic | ICD-10-CM

## 2018-03-27 DIAGNOSIS — N92.6 IRREGULAR MENSTRUATION, UNSPECIFIED: Chronic | ICD-10-CM

## 2018-03-27 DIAGNOSIS — Z42.1 ENCOUNTER FOR BREAST RECONSTRUCTION FOLLOWING MASTECTOMY: ICD-10-CM

## 2018-03-27 DIAGNOSIS — Z98.89 OTHER SPECIFIED POSTPROCEDURAL STATES: Chronic | ICD-10-CM

## 2018-03-27 DIAGNOSIS — I48.91 UNSPECIFIED ATRIAL FIBRILLATION: ICD-10-CM

## 2018-03-27 DIAGNOSIS — Z90.710 ACQUIRED ABSENCE OF BOTH CERVIX AND UTERUS: Chronic | ICD-10-CM

## 2018-03-27 DIAGNOSIS — Z98.82 BREAST IMPLANT STATUS: Chronic | ICD-10-CM

## 2018-03-27 LAB
BUN SERPL-MCNC: 21 MG/DL — SIGNIFICANT CHANGE UP (ref 7–23)
CALCIUM SERPL-MCNC: 9.4 MG/DL — SIGNIFICANT CHANGE UP (ref 8.4–10.5)
CHLORIDE SERPL-SCNC: 100 MMOL/L — SIGNIFICANT CHANGE UP (ref 98–107)
CO2 SERPL-SCNC: 29 MMOL/L — SIGNIFICANT CHANGE UP (ref 22–31)
CREAT SERPL-MCNC: 0.53 MG/DL — SIGNIFICANT CHANGE UP (ref 0.5–1.3)
GLUCOSE SERPL-MCNC: 76 MG/DL — SIGNIFICANT CHANGE UP (ref 70–99)
HCT VFR BLD CALC: 36.1 % — SIGNIFICANT CHANGE UP (ref 34.5–45)
HGB BLD-MCNC: 11.7 G/DL — SIGNIFICANT CHANGE UP (ref 11.5–15.5)
MCHC RBC-ENTMCNC: 30.6 PG — SIGNIFICANT CHANGE UP (ref 27–34)
MCHC RBC-ENTMCNC: 32.4 % — SIGNIFICANT CHANGE UP (ref 32–36)
MCV RBC AUTO: 94.5 FL — SIGNIFICANT CHANGE UP (ref 80–100)
NRBC # FLD: 0 — SIGNIFICANT CHANGE UP
PLATELET # BLD AUTO: 229 K/UL — SIGNIFICANT CHANGE UP (ref 150–400)
PMV BLD: 10.5 FL — SIGNIFICANT CHANGE UP (ref 7–13)
POTASSIUM SERPL-MCNC: 4.2 MMOL/L — SIGNIFICANT CHANGE UP (ref 3.5–5.3)
POTASSIUM SERPL-SCNC: 4.2 MMOL/L — SIGNIFICANT CHANGE UP (ref 3.5–5.3)
RBC # BLD: 3.82 M/UL — SIGNIFICANT CHANGE UP (ref 3.8–5.2)
RBC # FLD: 12.4 % — SIGNIFICANT CHANGE UP (ref 10.3–14.5)
SODIUM SERPL-SCNC: 138 MMOL/L — SIGNIFICANT CHANGE UP (ref 135–145)
WBC # BLD: 5.92 K/UL — SIGNIFICANT CHANGE UP (ref 3.8–10.5)
WBC # FLD AUTO: 5.92 K/UL — SIGNIFICANT CHANGE UP (ref 3.8–10.5)

## 2018-03-27 PROCEDURE — 93010 ELECTROCARDIOGRAM REPORT: CPT

## 2018-03-27 RX ORDER — ASPIRIN/CALCIUM CARB/MAGNESIUM 324 MG
1 TABLET ORAL
Qty: 0 | Refills: 0 | COMMUNITY

## 2018-03-27 RX ORDER — EVENING PRIMROSE OIL 500 MG
1 CAPSULE ORAL
Qty: 0 | Refills: 0 | COMMUNITY

## 2018-03-27 RX ORDER — OMEGA-3 ACID ETHYL ESTERS 1 G
1 CAPSULE ORAL
Qty: 0 | Refills: 0 | COMMUNITY

## 2018-03-27 RX ORDER — SODIUM CHLORIDE 9 MG/ML
1000 INJECTION, SOLUTION INTRAVENOUS
Qty: 0 | Refills: 0 | Status: DISCONTINUED | OUTPATIENT
Start: 2018-04-10 | End: 2018-04-25

## 2018-03-27 NOTE — H&P PST ADULT - NEGATIVE OPHTHALMOLOGIC SYMPTOMS
no pain L/no diplopia/no blurred vision L/no blurred vision R/no discharge R/no loss of vision L/no loss of vision R/no discharge L/no pain R

## 2018-03-27 NOTE — H&P PST ADULT - NEGATIVE ENMT SYMPTOMS
no throat pain/no dysphagia/no vertigo/no nasal obstruction/no nose bleeds/no hearing difficulty/no post-nasal discharge/no ear pain/no nasal congestion/no tinnitus/no sinus symptoms/no nasal discharge

## 2018-03-27 NOTE — H&P PST ADULT - HISTORY OF PRESENT ILLNESS
48 y/o female with hx of breast cancer (R), s/p bilateral mastectomy with implants.  pt scheduled for Bilateral revision of breast reconstruction 46 y/o female with hx of breast cancer (R), s/p bilateral mastectomy with implants .  Pt scheduled for Bilateral revision of breast reconstruction, bilateral nipple areola reconstruction bilateral fat grafting 4/10/18. 48 y/o female with hx of breast cancer (R), s/p bilateral mastectomy with bilateral implants .  Pt scheduled for Bilateral revision of breast reconstruction, bilateral nipple areola reconstruction bilateral fat grafting 4/10/18.

## 2018-03-27 NOTE — H&P PST ADULT - NEGATIVE GENERAL GENITOURINARY SYMPTOMS
normal urinary frequency/no bladder infections/no nocturia/no flank pain L/no flank pain R/no incontinence/no urinary hesitancy/no hematuria/no renal colic/no dysuria

## 2018-03-27 NOTE — H&P PST ADULT - ASSESSMENT
46 y/o female with hx of breast cancer (R), s/p bilateral mastectomy with bilateral implants. Pt scheduled for Bilateral revision of breast reconstruction, bilateral nipple areola reconstruction bilateral fat grafting 4/10/18.

## 2018-03-27 NOTE — H&P PST ADULT - NEGATIVE NEUROLOGICAL SYMPTOMS
no facial palsy/no paresthesias/no generalized seizures/no vertigo/no difficulty walking/no focal seizures/no tremors/no loss of sensation/no headache/no confusion/no transient paralysis/no weakness/no syncope

## 2018-03-27 NOTE — H&P PST ADULT - PMH
Afib  paroxismal A fib  DCIS (ductal carcinoma in situ) of breast  Right  Intramural leiomyoma of uterus Afib  paroxismal A fib  DCIS (ductal carcinoma in situ) of breast  Right  Hypotension  pt reports she has low BP (90/60). takes salt tablets  Intramural leiomyoma of uterus    SVT (supraventricular tachycardia)  s/p cardiac ablation 2005

## 2018-03-27 NOTE — H&P PST ADULT - PSH
H/O bilateral mastectomy  flap and tissue expander on 10/3/17  H/O prior ablation treatment  cardiac ablation 2005  H/O: hysterectomy  partial, Right salpingo oophorectomy 2015  Irregular uterine bleeding  uterine cauterization 2011  S/P  section  2008 H/O bilateral breast implants  2018  H/O bilateral mastectomy  flap and tissue expander on 10/3/17  H/O prior ablation treatment  cardiac ablation 2005  H/O: hysterectomy  partial, Right salpingo oophorectomy 2015  Irregular uterine bleeding  uterine cauterization 2011  S/P  section  2008

## 2018-03-27 NOTE — H&P PST ADULT - NEGATIVE GENERAL SYMPTOMS
no fever/no weight gain/no polyuria/no polydipsia/no weight loss/no polyphagia/no fatigue/no chills/no malaise/no sweating/no anorexia

## 2018-03-27 NOTE — H&P PST ADULT - CARDIOVASCULAR COMMENTS
Paroxysmal Afib - on aspirin and beta blocker Paroxysmal Afib - on aspirin and beta blocker.  Pt reports hx of hypotension, on salt tablets

## 2018-03-27 NOTE — H&P PST ADULT - NEGATIVE CARDIOVASCULAR SYMPTOMS
no peripheral edema/no orthopnea/no paroxysmal nocturnal dyspnea/no claudication/no dyspnea on exertion/no chest pain/no palpitations

## 2018-03-27 NOTE — H&P PST ADULT - PROBLEM SELECTOR PLAN 1
Bilateral revision of breast reconstruction, bilateral nipple areola reconstruction bilateral fat grafting 4/10/18.     CBC BMP     Antibacterial soap given and explained

## 2018-03-28 ENCOUNTER — APPOINTMENT (OUTPATIENT)
Dept: SURGERY | Facility: CLINIC | Age: 48
End: 2018-03-28
Payer: COMMERCIAL

## 2018-03-28 PROCEDURE — 99213K: CUSTOM

## 2018-04-10 ENCOUNTER — OUTPATIENT (OUTPATIENT)
Dept: OUTPATIENT SERVICES | Facility: HOSPITAL | Age: 48
LOS: 1 days | Discharge: HOME | End: 2018-04-10
Payer: COMMERCIAL

## 2018-04-10 VITALS
SYSTOLIC BLOOD PRESSURE: 116 MMHG | DIASTOLIC BLOOD PRESSURE: 66 MMHG | TEMPERATURE: 97 F | RESPIRATION RATE: 19 BRPM | HEART RATE: 49 BPM | OXYGEN SATURATION: 100 %

## 2018-04-10 VITALS
TEMPERATURE: 98 F | WEIGHT: 145.06 LBS | DIASTOLIC BLOOD PRESSURE: 59 MMHG | OXYGEN SATURATION: 100 % | SYSTOLIC BLOOD PRESSURE: 111 MMHG | HEART RATE: 55 BPM | RESPIRATION RATE: 20 BRPM | HEIGHT: 67 IN

## 2018-04-10 DIAGNOSIS — Z42.1 ENCOUNTER FOR BREAST RECONSTRUCTION FOLLOWING MASTECTOMY: ICD-10-CM

## 2018-04-10 DIAGNOSIS — Z90.13 ACQUIRED ABSENCE OF BILATERAL BREASTS AND NIPPLES: Chronic | ICD-10-CM

## 2018-04-10 DIAGNOSIS — Z98.89 OTHER SPECIFIED POSTPROCEDURAL STATES: Chronic | ICD-10-CM

## 2018-04-10 DIAGNOSIS — N92.6 IRREGULAR MENSTRUATION, UNSPECIFIED: Chronic | ICD-10-CM

## 2018-04-10 DIAGNOSIS — Z98.82 BREAST IMPLANT STATUS: Chronic | ICD-10-CM

## 2018-04-10 DIAGNOSIS — Z90.710 ACQUIRED ABSENCE OF BOTH CERVIX AND UTERUS: Chronic | ICD-10-CM

## 2018-04-10 PROCEDURE — 19350 NIPPLE/AREOLA RECONSTRUCTION: CPT | Mod: 58,RT

## 2018-04-10 PROCEDURE — 15200 FTH/GFT FR TRNK 20 SQ CM/<: CPT | Mod: 58,59

## 2018-04-10 PROCEDURE — 14001 TIS TRNFR TRUNK 10.1-30SQCM: CPT | Mod: 58,59

## 2018-04-10 PROCEDURE — 20926: CPT | Mod: 58,59,RT

## 2018-04-10 PROCEDURE — 19380 REVJ RECONSTRUCTED BREAST: CPT | Mod: 58,59,LT

## 2018-04-10 PROCEDURE — 15877 SUCTION LIPECTOMY TRUNK: CPT | Mod: 58,59

## 2018-04-10 RX ORDER — ASPIRIN/CALCIUM CARB/MAGNESIUM 324 MG
1 TABLET ORAL
Qty: 0 | Refills: 0 | COMMUNITY

## 2018-04-10 RX ORDER — OMEGA-3 ACID ETHYL ESTERS 1 G
1 CAPSULE ORAL
Qty: 0 | Refills: 0 | COMMUNITY

## 2018-04-10 NOTE — ASU DISCHARGE PLAN (ADULT/PEDIATRIC). - ITEMS TO FOLLOWUP WITH YOUR PHYSICIAN'S
Please follow up with Dr. Fuentes within x1 week after discharge from the hospital. You may call [phone] to schedule an appointment. Follow up with him as to when to restart your aspirin and other supplements.

## 2018-04-10 NOTE — BRIEF OPERATIVE NOTE - PROCEDURE
<<-----Click on this checkbox to enter Procedure Reconstruction of both nipples  04/10/2018    Active  [facility]  Revision of reconstructed breast  04/10/2018    Active  [facility]7

## 2018-04-10 NOTE — ASU DISCHARGE PLAN (ADULT/PEDIATRIC). - NURSING INSTRUCTIONS
Patient is stable and meets discharge criteria. Patient made aware that he/she must wait on unit to be escorted by ASU RN or ASU PCA to awaiting car in front of the main building after being discharged by Anesthesia Department. Patient is stable and meets discharge criteria. Patient made aware that he/she must wait on unit to be escorted by ASU RN or ASU PCA to awaiting car in front of the main building after being discharged by Anesthesia Department. Do not take pain medication on an empty stomach.  Increase fluids and fiber in diet to prevent constipation.

## 2018-04-10 NOTE — ASU PATIENT PROFILE, ADULT - PMH
Afib  paroxismal A fib  DCIS (ductal carcinoma in situ) of breast  Right  Hypotension  pt reports she has low BP (90/60). takes salt tablets  Intramural leiomyoma of uterus    SVT (supraventricular tachycardia)  s/p cardiac ablation 2005
activity as tolerated

## 2018-04-10 NOTE — ASU PATIENT PROFILE, ADULT - PSH
H/O bilateral breast implants  2018  H/O bilateral mastectomy  flap and tissue expander on 10/3/17  H/O prior ablation treatment  cardiac ablation 2005  H/O: hysterectomy  partial, Right salpingo oophorectomy 2015  Irregular uterine bleeding  uterine cauterization 2011  S/P  section  2008

## 2018-04-10 NOTE — ASU DISCHARGE PLAN (ADULT/PEDIATRIC). - MEDICATION SUMMARY - MEDICATIONS TO TAKE
I will START or STAY ON the medications listed below when I get home from the hospital:    Norco 5 mg-325 mg oral tablet  -- 1 tab(s) by mouth every 6 hours  -- Indication: For Pain medication    sotalol 80 mg oral tablet  -- 2 tab(s) by mouth once a day  -- Indication: For home med    sotalol 80 mg oral tablet  -- 1 tab(s) by mouth once a day (at bedtime)  -- Indication: For home med    cephalexin 500 mg oral capsule  -- 1 cap(s) by mouth every 12 hours  -- Indication: For abx ppx    Sodium Chloride 1 g oral tablet  -- 1 tab(s) by mouth 2 times a day  -- Indication: For home med    magnesium carbonate  -- 500 milligram(s) by mouth once a day  -- Indication: For  home med    Vitamin D3 1000 intl units oral capsule  -- 1 cap(s) by mouth once a day  -- Indication: For home med    Vitamin B6 100 mg oral tablet  -- 1 tab(s) by mouth once a day  -- Indication: For home med    Vitamin B12 1000 mcg oral tablet  -- 1 tab(s) by mouth once a day  -- Indication: For home med

## 2018-04-10 NOTE — ASU DISCHARGE PLAN (ADULT/PEDIATRIC). - MEDICATION SUMMARY - MEDICATIONS TO STOP TAKING
I will STOP taking the medications listed below when I get home from the hospital:    aspirin 325 mg oral tablet  -- 1 tab(s) by mouth once a day (at bedtime)  last dose 4/2/18    Fish Oil 1200 mg oral capsule  -- 1 cap(s) by mouth once a day  last dose 4/2    Evening Primrose Oil oral capsule  -- 1000 milligram(s) by mouth  last dose 4/3    Multiple Vitamins oral tablet  -- 1 tab(s) by mouth once a day last dose 4/2

## 2018-04-10 NOTE — ASU DISCHARGE PLAN (ADULT/PEDIATRIC). - NOTIFY
Bleeding that does not stop/Numbness, color, or temperature change to extremity/Pain not relieved by Medications/Swelling that continues/Fever greater than 101 Fever greater than 101/Numbness, color, or temperature change to extremity/Persistent Nausea and Vomiting/Swelling that continues/Bleeding that does not stop/Pain not relieved by Medications/Unable to Urinate/Excessive Diarrhea/Inability to Tolerate Liquids or Foods

## 2018-07-16 PROBLEM — D05.90 UNSPECIFIED TYPE OF CARCINOMA IN SITU OF UNSPECIFIED BREAST: Chronic | Status: INACTIVE | Noted: 2017-09-19 | Resolved: 2018-01-03

## 2018-07-17 NOTE — H&P PST ADULT - NEGATIVE IMMUNOLOGICAL SYMPTOMS
[facility] phone call message- medication clarification/question:    Full Medication Name:    Strength:     Have you contacted your pharmacy about this refill request?     If  Yes,  which pharmacy?    When did you contact the pharmacy?    Additional comments/concerns from call to pharmacy:    Reason for call to clinic: Patient is calling to get her anxiety and BP medication to be send to her since she is out of town and didn't bring her meds with her. She states she don't know the names of her medications since she don't have it with her. She would like Rx sent to [address] and [facility] , [facility]. She states she has been in out of the hospital. She states she just got discharged yesterday. Told her that she has not been since 7/2017 she might need an appt for her medication request. Also, that she might need to be seen at a clinic close by where she is at now to get her medications. She states she don't understand why she would when she is on these medications and forgot her meds at home. She can't make an appt she is out of town. Told her it could take up to two business day for a response. Please call and advise.       OK to leave a message on voice mail? Yes    Primary language: English      needed? No    Call taken on July 17, 2018 at 10:24 AM by Vincenzo Leon   no recurring infections/no persistent infections

## 2018-08-13 NOTE — ASU PREOP CHECKLIST - HEIGHT IN INCHES
How Severe Are Your Spot(S)?: mild Have Your Spot(S) Been Treated In The Past?: has not been treated Hpi Title: Evaluation of a Skin Lesion Year Removed: 1900 6.75

## 2018-08-21 PROBLEM — I47.1 SUPRAVENTRICULAR TACHYCARDIA: Chronic | Status: ACTIVE | Noted: 2018-03-27

## 2018-08-21 PROBLEM — D05.10 INTRADUCTAL CARCINOMA IN SITU OF UNSPECIFIED BREAST: Chronic | Status: ACTIVE | Noted: 2018-01-03

## 2018-08-22 ENCOUNTER — APPOINTMENT (OUTPATIENT)
Dept: SURGERY | Facility: CLINIC | Age: 48
End: 2018-08-22
Payer: COMMERCIAL

## 2018-08-22 PROCEDURE — 99213K: CUSTOM

## 2018-12-02 ENCOUNTER — RESULT REVIEW (OUTPATIENT)
Age: 48
End: 2018-12-02

## 2018-12-03 ENCOUNTER — RESULT REVIEW (OUTPATIENT)
Age: 48
End: 2018-12-03

## 2018-12-03 ENCOUNTER — OUTPATIENT (OUTPATIENT)
Dept: OUTPATIENT SERVICES | Facility: HOSPITAL | Age: 48
LOS: 1 days | End: 2018-12-03
Payer: COMMERCIAL

## 2018-12-03 ENCOUNTER — APPOINTMENT (OUTPATIENT)
Dept: ULTRASOUND IMAGING | Facility: IMAGING CENTER | Age: 48
End: 2018-12-03
Payer: COMMERCIAL

## 2018-12-03 DIAGNOSIS — Z98.89 OTHER SPECIFIED POSTPROCEDURAL STATES: Chronic | ICD-10-CM

## 2018-12-03 DIAGNOSIS — Z90.13 ACQUIRED ABSENCE OF BILATERAL BREASTS AND NIPPLES: Chronic | ICD-10-CM

## 2018-12-03 DIAGNOSIS — Z90.710 ACQUIRED ABSENCE OF BOTH CERVIX AND UTERUS: Chronic | ICD-10-CM

## 2018-12-03 DIAGNOSIS — N92.6 IRREGULAR MENSTRUATION, UNSPECIFIED: Chronic | ICD-10-CM

## 2018-12-03 DIAGNOSIS — Z00.8 ENCOUNTER FOR OTHER GENERAL EXAMINATION: ICD-10-CM

## 2018-12-03 DIAGNOSIS — Z98.82 BREAST IMPLANT STATUS: Chronic | ICD-10-CM

## 2018-12-03 PROCEDURE — 88305 TISSUE EXAM BY PATHOLOGIST: CPT

## 2018-12-03 PROCEDURE — 88173 CYTOPATH EVAL FNA REPORT: CPT | Mod: 26

## 2018-12-03 PROCEDURE — 88173 CYTOPATH EVAL FNA REPORT: CPT

## 2018-12-03 PROCEDURE — 76942 ECHO GUIDE FOR BIOPSY: CPT

## 2018-12-03 PROCEDURE — 19000 PUNCTURE ASPIR CYST BREAST: CPT

## 2018-12-03 PROCEDURE — 19000 PUNCTURE ASPIR CYST BREAST: CPT | Mod: LT

## 2018-12-03 PROCEDURE — 88305 TISSUE EXAM BY PATHOLOGIST: CPT | Mod: 26

## 2018-12-03 PROCEDURE — 76942 ECHO GUIDE FOR BIOPSY: CPT | Mod: 26,76

## 2018-12-04 LAB
NON-GYNECOLOGICAL CYTOLOGY STUDY: SIGNIFICANT CHANGE UP
NON-GYNECOLOGICAL CYTOLOGY STUDY: SIGNIFICANT CHANGE UP

## 2019-02-10 NOTE — H&P PST ADULT - PROBLEM SELECTOR PLAN 2
6 paroxysmal Afib. Currently sinus alexander on ekg. Last aspirin dose on 9/26/17. Instructed to take sotalol on the morning of procedure with a sip of water. Pending cardiac clearance. has appt today. Pending last stress test and echo

## 2019-05-22 ENCOUNTER — APPOINTMENT (OUTPATIENT)
Dept: ULTRASOUND IMAGING | Facility: IMAGING CENTER | Age: 49
End: 2019-05-22

## 2019-05-29 ENCOUNTER — RESULT REVIEW (OUTPATIENT)
Age: 49
End: 2019-05-29

## 2019-05-29 ENCOUNTER — APPOINTMENT (OUTPATIENT)
Dept: ULTRASOUND IMAGING | Facility: IMAGING CENTER | Age: 49
End: 2019-05-29
Payer: COMMERCIAL

## 2019-05-29 ENCOUNTER — OUTPATIENT (OUTPATIENT)
Dept: OUTPATIENT SERVICES | Facility: HOSPITAL | Age: 49
LOS: 1 days | End: 2019-05-29
Payer: COMMERCIAL

## 2019-05-29 DIAGNOSIS — Z90.710 ACQUIRED ABSENCE OF BOTH CERVIX AND UTERUS: Chronic | ICD-10-CM

## 2019-05-29 DIAGNOSIS — Z98.82 BREAST IMPLANT STATUS: Chronic | ICD-10-CM

## 2019-05-29 DIAGNOSIS — Z98.89 OTHER SPECIFIED POSTPROCEDURAL STATES: Chronic | ICD-10-CM

## 2019-05-29 DIAGNOSIS — Z00.8 ENCOUNTER FOR OTHER GENERAL EXAMINATION: ICD-10-CM

## 2019-05-29 DIAGNOSIS — N92.6 IRREGULAR MENSTRUATION, UNSPECIFIED: Chronic | ICD-10-CM

## 2019-05-29 DIAGNOSIS — Z90.13 ACQUIRED ABSENCE OF BILATERAL BREASTS AND NIPPLES: Chronic | ICD-10-CM

## 2019-05-29 PROCEDURE — 19001 PUNCTURE ASPIR CYST BRST EA: CPT

## 2019-05-29 PROCEDURE — 19000 PUNCTURE ASPIR CYST BREAST: CPT | Mod: RT

## 2019-05-29 PROCEDURE — 76942 ECHO GUIDE FOR BIOPSY: CPT

## 2019-05-29 PROCEDURE — 76942 ECHO GUIDE FOR BIOPSY: CPT | Mod: 26

## 2019-05-29 PROCEDURE — 19000 PUNCTURE ASPIR CYST BREAST: CPT

## 2019-05-29 PROCEDURE — 88173 CYTOPATH EVAL FNA REPORT: CPT

## 2019-05-29 PROCEDURE — 88173 CYTOPATH EVAL FNA REPORT: CPT | Mod: 26

## 2019-05-29 PROCEDURE — 19001 PUNCTURE ASPIR CYST BRST EA: CPT | Mod: LT

## 2019-05-30 LAB
NON-GYNECOLOGICAL CYTOLOGY STUDY: SIGNIFICANT CHANGE UP

## 2019-08-14 ENCOUNTER — APPOINTMENT (OUTPATIENT)
Dept: SURGERY | Facility: CLINIC | Age: 49
End: 2019-08-14
Payer: COMMERCIAL

## 2019-08-14 PROCEDURE — 99213K: CUSTOM

## 2019-08-23 ENCOUNTER — OUTPATIENT (OUTPATIENT)
Dept: OUTPATIENT SERVICES | Facility: HOSPITAL | Age: 49
LOS: 1 days | End: 2019-08-23

## 2019-08-23 VITALS
DIASTOLIC BLOOD PRESSURE: 70 MMHG | WEIGHT: 147.93 LBS | HEART RATE: 64 BPM | HEIGHT: 67 IN | TEMPERATURE: 98 F | RESPIRATION RATE: 16 BRPM | SYSTOLIC BLOOD PRESSURE: 92 MMHG | OXYGEN SATURATION: 98 %

## 2019-08-23 DIAGNOSIS — Z90.13 ACQUIRED ABSENCE OF BILATERAL BREASTS AND NIPPLES: Chronic | ICD-10-CM

## 2019-08-23 DIAGNOSIS — Z90.710 ACQUIRED ABSENCE OF BOTH CERVIX AND UTERUS: Chronic | ICD-10-CM

## 2019-08-23 DIAGNOSIS — Z01.818 ENCOUNTER FOR OTHER PREPROCEDURAL EXAMINATION: ICD-10-CM

## 2019-08-23 DIAGNOSIS — N92.6 IRREGULAR MENSTRUATION, UNSPECIFIED: Chronic | ICD-10-CM

## 2019-08-23 DIAGNOSIS — Z98.89 OTHER SPECIFIED POSTPROCEDURAL STATES: Chronic | ICD-10-CM

## 2019-08-23 DIAGNOSIS — C50.919 MALIGNANT NEOPLASM OF UNSPECIFIED SITE OF UNSPECIFIED FEMALE BREAST: ICD-10-CM

## 2019-08-23 DIAGNOSIS — I48.91 UNSPECIFIED ATRIAL FIBRILLATION: ICD-10-CM

## 2019-08-23 DIAGNOSIS — Z98.82 BREAST IMPLANT STATUS: Chronic | ICD-10-CM

## 2019-08-23 LAB
ANION GAP SERPL CALC-SCNC: 15 MMO/L — HIGH (ref 7–14)
BUN SERPL-MCNC: 17 MG/DL — SIGNIFICANT CHANGE UP (ref 7–23)
CALCIUM SERPL-MCNC: 9.5 MG/DL — SIGNIFICANT CHANGE UP (ref 8.4–10.5)
CHLORIDE SERPL-SCNC: 101 MMOL/L — SIGNIFICANT CHANGE UP (ref 98–107)
CO2 SERPL-SCNC: 23 MMOL/L — SIGNIFICANT CHANGE UP (ref 22–31)
CREAT SERPL-MCNC: 0.5 MG/DL — SIGNIFICANT CHANGE UP (ref 0.5–1.3)
GLUCOSE SERPL-MCNC: 56 MG/DL — LOW (ref 70–99)
HCT VFR BLD CALC: 39.9 % — SIGNIFICANT CHANGE UP (ref 34.5–45)
HGB BLD-MCNC: 12.4 G/DL — SIGNIFICANT CHANGE UP (ref 11.5–15.5)
MCHC RBC-ENTMCNC: 30.1 PG — SIGNIFICANT CHANGE UP (ref 27–34)
MCHC RBC-ENTMCNC: 31.1 % — LOW (ref 32–36)
MCV RBC AUTO: 96.8 FL — SIGNIFICANT CHANGE UP (ref 80–100)
NRBC # FLD: 0 K/UL — SIGNIFICANT CHANGE UP (ref 0–0)
PLATELET # BLD AUTO: 239 K/UL — SIGNIFICANT CHANGE UP (ref 150–400)
PMV BLD: 11.2 FL — SIGNIFICANT CHANGE UP (ref 7–13)
POTASSIUM SERPL-MCNC: 4.6 MMOL/L — SIGNIFICANT CHANGE UP (ref 3.5–5.3)
POTASSIUM SERPL-SCNC: 4.6 MMOL/L — SIGNIFICANT CHANGE UP (ref 3.5–5.3)
RBC # BLD: 4.12 M/UL — SIGNIFICANT CHANGE UP (ref 3.8–5.2)
RBC # FLD: 12.5 % — SIGNIFICANT CHANGE UP (ref 10.3–14.5)
SODIUM SERPL-SCNC: 139 MMOL/L — SIGNIFICANT CHANGE UP (ref 135–145)
WBC # BLD: 4.89 K/UL — SIGNIFICANT CHANGE UP (ref 3.8–10.5)
WBC # FLD AUTO: 4.89 K/UL — SIGNIFICANT CHANGE UP (ref 3.8–10.5)

## 2019-08-23 RX ORDER — PREGABALIN 225 MG/1
1 CAPSULE ORAL
Qty: 0 | Refills: 0 | DISCHARGE

## 2019-08-23 RX ORDER — SOTALOL HCL 120 MG
1 TABLET ORAL
Qty: 0 | Refills: 0 | DISCHARGE

## 2019-08-23 RX ORDER — SODIUM CHLORIDE 9 MG/ML
1 INJECTION INTRAMUSCULAR; INTRAVENOUS; SUBCUTANEOUS
Qty: 0 | Refills: 0 | DISCHARGE

## 2019-08-23 RX ORDER — PYRIDOXINE HCL (VITAMIN B6) 100 MG
1 TABLET ORAL
Qty: 0 | Refills: 0 | DISCHARGE

## 2019-08-23 RX ORDER — CHOLECALCIFEROL (VITAMIN D3) 125 MCG
1 CAPSULE ORAL
Qty: 0 | Refills: 0 | DISCHARGE

## 2019-08-23 RX ORDER — CEPHALEXIN 500 MG
1 CAPSULE ORAL
Qty: 0 | Refills: 0 | DISCHARGE

## 2019-08-23 RX ORDER — SODIUM CHLORIDE 9 MG/ML
1000 INJECTION, SOLUTION INTRAVENOUS
Refills: 0 | Status: DISCONTINUED | OUTPATIENT
Start: 2019-09-10 | End: 2019-10-04

## 2019-08-23 NOTE — H&P PST ADULT - NSICDXPASTSURGICALHX_GEN_ALL_CORE_FT
PAST SURGICAL HISTORY:  H/O bilateral breast implants 2018, revision 2018    H/O bilateral mastectomy flap and tissue expander on 10/3/17    H/O prior ablation treatment cardiac ablation 2005    H/O: hysterectomy partial, Right salpingo oophorectomy 2015    History of hysterectomy     Irregular uterine bleeding uterine cauterization 2011    S/P  section 2008

## 2019-08-23 NOTE — H&P PST ADULT - ASSESSMENT
49 y.o. female with preop diagnosis of malignant neoplasm of unspecified site of unspecified female breast, encounter for cosmetic surgery

## 2019-08-23 NOTE — H&P PST ADULT - NEGATIVE ENMT SYMPTOMS
no post-nasal discharge/no dysphagia/no nasal congestion/no sinus symptoms/no throat pain/no hearing difficulty

## 2019-08-23 NOTE — H&P PST ADULT - NEGATIVE MUSCULOSKELETAL SYMPTOMS
no neck pain/no stiffness/no muscle cramps/no muscle weakness/no arthritis/no joint swelling/no myalgia

## 2019-08-23 NOTE — H&P PST ADULT - HISTORY OF PRESENT ILLNESS
49 y.o. female with hx of breast cancer (R), s/p bilateral mastectomy with bilateral implants, s/p Bilateral revision of breast reconstruction, bilateral nipple areola reconstruction bilateral fat grafting in 4/10/18, presents to PST for evaluation for Bilateral Revision of Breast Reconstruction, Bilateral Exchange of Implants, Bilateral Capsulectomy, Bilateral Fat Grafting, Alloderm on 09/10/19. 49 y.o. female with hx of SVT, s/p ablation 2005, paroxysmal Afib,  breast cancer (R), s/p bilateral mastectomy with bilateral implants, s/p Bilateral revision of breast reconstruction, bilateral nipple areola reconstruction bilateral fat grafting in 4/10/18, c/o breast asymmetry, presents to PST for evaluation for Bilateral Revision of Breast Reconstruction, Bilateral Exchange of Implants, Bilateral Capsulectomy, Bilateral Fat Grafting, Alloderm on 09/10/19.

## 2019-08-23 NOTE — H&P PST ADULT - NSICDXPROBLEM_GEN_ALL_CORE_FT
PROBLEM DIAGNOSES  Problem: Breast cancer  Assessment and Plan: pt scheduled for Bilateral Revision of Breast Reconstruction, Bilateral Exchange of Implants, Bilateral Capsulectomy, Bilateral Fat Grafting, Alloderm on 09/10/19  Preop instructions provided. Pt verbalized understanding.   Pepcid for GI prophylaxis with written and verbal instruction provided    written and verbal instructions with teach back on chlorhexidine shampoo provided,  pt verbalized understanding with returned demonstration   s/p cardiologist navdeep on 08/21/19, copy requested    Problem: Afib  Assessment and Plan: continue sotalol, hold aspirin from 08/27/19 as per cardiologist  EKG and echo requested from cardiologist office, pending PROBLEM DIAGNOSES  Problem: Breast cancer  Assessment and Plan: pt scheduled for Bilateral Revision of Breast Reconstruction, Bilateral Exchange of Implants, Bilateral Capsulectomy, Bilateral Fat Grafting, Alloderm on 09/10/19  Preop instructions provided. Pt verbalized understanding.   Pepcid for GI prophylaxis with written and verbal instruction provided    written and verbal instructions with teach back on chlorhexidine shampoo provided,  pt verbalized understanding with returned demonstration   s/p cardiologist navdeep on 08/21/19, copy requested  allergy to PCN, Levaquin, Zithotromax, OR booking notified    Problem: Afib  Assessment and Plan: continue sotalol, hold aspirin from 08/27/19 as per cardiologist  EKG and echo requested from cardiologist office, pending PROBLEM DIAGNOSES  Problem: Breast cancer  Assessment and Plan: pt scheduled for Bilateral Revision of Breast Reconstruction, Bilateral Exchange of Implants, Bilateral Capsulectomy, Bilateral Fat Grafting, Alloderm on 09/10/19  Preop instructions provided. Pt verbalized understanding.   Pepcid for GI prophylaxis with written and verbal instruction provided    written and verbal instructions with teach back on chlorhexidine shampoo provided,  pt verbalized understanding with returned demonstration   s/p cardiologist navdeep on 08/21/19, copy requested  allergy to PCN, Levaquin, Zithromax, OR booking notified  hold supplements/vitamins 2 weeks as per surgeon instructions    Problem: Afib  Assessment and Plan: continue sotalol, hold aspirin 10 days preop as per cardiologist  EKG and echo requested from cardiologist office, pending

## 2019-08-23 NOTE — H&P PST ADULT - ANESTHESIA, PREVIOUS REACTION, PROFILE
nausea/vomiting/"severe vomiting after mastectomy 2017"denies family Hx of malignant hyperthermia - pt is adopted

## 2019-08-23 NOTE — H&P PST ADULT - NSICDXPASTMEDICALHX_GEN_ALL_CORE_FT
PAST MEDICAL HISTORY:  Afib paroxismal A fib    DCIS (ductal carcinoma in situ) of breast Right    Hypotension pt reports she has low BP (90/60).    Intramural leiomyoma of uterus     SVT (supraventricular tachycardia) s/p cardiac ablation 2005 PAST MEDICAL HISTORY:  Afib paroxysmal A fib    DCIS (ductal carcinoma in situ) of breast Right    Hypotension pt reports she has low BP (90/60).    Intramural leiomyoma of uterus     SVT (supraventricular tachycardia) s/p cardiac ablation 2005

## 2019-09-10 ENCOUNTER — OUTPATIENT (OUTPATIENT)
Dept: OUTPATIENT SERVICES | Facility: HOSPITAL | Age: 49
LOS: 1 days | Discharge: HOME | End: 2019-09-10
Payer: COMMERCIAL

## 2019-09-10 VITALS
OXYGEN SATURATION: 97 % | TEMPERATURE: 98 F | DIASTOLIC BLOOD PRESSURE: 62 MMHG | SYSTOLIC BLOOD PRESSURE: 116 MMHG | RESPIRATION RATE: 12 BRPM | HEART RATE: 60 BPM

## 2019-09-10 VITALS
HEIGHT: 67 IN | TEMPERATURE: 98 F | HEART RATE: 60 BPM | WEIGHT: 147.93 LBS | OXYGEN SATURATION: 100 % | RESPIRATION RATE: 20 BRPM | SYSTOLIC BLOOD PRESSURE: 92 MMHG | DIASTOLIC BLOOD PRESSURE: 67 MMHG

## 2019-09-10 DIAGNOSIS — Z98.89 OTHER SPECIFIED POSTPROCEDURAL STATES: Chronic | ICD-10-CM

## 2019-09-10 DIAGNOSIS — Z90.710 ACQUIRED ABSENCE OF BOTH CERVIX AND UTERUS: Chronic | ICD-10-CM

## 2019-09-10 DIAGNOSIS — N92.6 IRREGULAR MENSTRUATION, UNSPECIFIED: Chronic | ICD-10-CM

## 2019-09-10 DIAGNOSIS — Z98.82 BREAST IMPLANT STATUS: Chronic | ICD-10-CM

## 2019-09-10 DIAGNOSIS — C50.919 MALIGNANT NEOPLASM OF UNSPECIFIED SITE OF UNSPECIFIED FEMALE BREAST: ICD-10-CM

## 2019-09-10 DIAGNOSIS — Z90.13 ACQUIRED ABSENCE OF BILATERAL BREASTS AND NIPPLES: Chronic | ICD-10-CM

## 2019-09-10 PROCEDURE — 20926: CPT | Mod: 59,RT

## 2019-09-10 PROCEDURE — 19380 REVJ RECONSTRUCTED BREAST: CPT | Mod: 59,LT

## 2019-09-10 PROCEDURE — 15877 SUCTION LIPECTOMY TRUNK: CPT | Mod: 59

## 2019-09-10 PROCEDURE — 15822 BLEPHAROPLASTY UPPER EYELID: CPT | Mod: 50,59

## 2019-09-10 RX ORDER — ASPIRIN/CALCIUM CARB/MAGNESIUM 324 MG
1 TABLET ORAL
Qty: 0 | Refills: 0 | DISCHARGE

## 2019-09-10 RX ORDER — SOTALOL HCL 120 MG
2 TABLET ORAL
Qty: 0 | Refills: 0 | DISCHARGE

## 2019-09-10 RX ORDER — GUAIFENESIN/PHENYLPROPANOLAMIN
0 EXPECTORANT ORAL
Qty: 0 | Refills: 0 | DISCHARGE

## 2019-09-10 RX ORDER — MAGNESIUM CARBONATE 54 MG/5 ML
500 LIQUID (ML) ORAL
Qty: 0 | Refills: 0 | DISCHARGE

## 2019-09-10 RX ORDER — ONDANSETRON 8 MG/1
4 TABLET, FILM COATED ORAL ONCE
Refills: 0 | Status: DISCONTINUED | OUTPATIENT
Start: 2019-09-10 | End: 2019-10-04

## 2019-09-10 RX ORDER — HYDROMORPHONE HYDROCHLORIDE 2 MG/ML
0.5 INJECTION INTRAMUSCULAR; INTRAVENOUS; SUBCUTANEOUS
Refills: 0 | Status: DISCONTINUED | OUTPATIENT
Start: 2019-09-10 | End: 2019-09-10

## 2019-09-10 NOTE — BRIEF OPERATIVE NOTE - NSICDXBRIEFPOSTOP_GEN_ALL_CORE_FT
POST-OP DIAGNOSIS:  Breast CA 10-Sep-2019 14:54:38  Papa Daniels  Blepharochalasis, acquired, unspecified laterality 10-Sep-2019 14:54:21  Papa Daniels

## 2019-09-10 NOTE — ASU DISCHARGE PLAN (ADULT/PEDIATRIC) - NURSING INSTRUCTIONS
Do not take pain medication on an empty stomach.  Increase fluids and fiber in diet to prevent constipation. Do not take pain medication on an empty stomach.  Increase fluids and fiber in diet to prevent constipation. no tylenol or products containing acetominophen until after 8:00pm tonight follow all instructions as per dr jain

## 2019-09-10 NOTE — ASU PATIENT PROFILE, ADULT - ANESTHESIA, PREVIOUS REACTION, PROFILE
"severe vomiting after mastectomy 2017"denies family Hx of malignant hyperthermia - pt is adopted/nausea/vomiting

## 2019-09-10 NOTE — BRIEF OPERATIVE NOTE - NSICDXBRIEFPREOP_GEN_ALL_CORE_FT
PRE-OP DIAGNOSIS:  Blepharochalasis, acquired, unspecified laterality 10-Sep-2019 14:54:14  Papa Daniels  Breast CA 10-Sep-2019 14:53:18  Papa Daniels

## 2019-09-10 NOTE — ASU DISCHARGE PLAN (ADULT/PEDIATRIC) - CARE PROVIDER_API CALL
Shahram Fuentes)  Plastic Surgery  [address] [place] [address], [address]  [place] [address]  Phone: [phone]  Fax: [phone]  Follow Up Time:

## 2019-09-10 NOTE — ASU PATIENT PROFILE, ADULT - PMH
Afib  paroxysmal A fib  DCIS (ductal carcinoma in situ) of breast  Right  Hypotension  pt reports she has low BP (90/60).  Intramural leiomyoma of uterus    SVT (supraventricular tachycardia)  s/p cardiac ablation 2005

## 2019-09-10 NOTE — ASU PATIENT PROFILE, ADULT - PSH
H/O bilateral breast implants  2018, revision 2018  H/O bilateral mastectomy  flap and tissue expander on 10/3/17  H/O prior ablation treatment  cardiac ablation 2005  H/O: hysterectomy  partial, Right salpingo oophorectomy 2015  History of hysterectomy    Irregular uterine bleeding  uterine cauterization 2011  S/P  section  2008

## 2019-09-10 NOTE — BRIEF OPERATIVE NOTE - NSICDXBRIEFPROCEDURE_GEN_ALL_CORE_FT
PROCEDURES:  Breast implant revision 10-Sep-2019 14:53:02  Papa Daniels  Fat grafting 10-Sep-2019 14:52:49  Papa Daniels  Blepharoplasty, upper eyelid, bilateral 10-Sep-2019 14:52:39  Papa Daniels

## 2019-09-10 NOTE — ASU DISCHARGE PLAN (ADULT/PEDIATRIC) - ASU DC SPECIAL INSTRUCTIONSFT
keep cold compresses to both eyes continuously for 2 days  apply eye drops to both eyes every 1 hour while awake  keep bra and abdominal binder in place until f/u, may sponge bath. Hand held shower waist down only. keep cold compresses to both eyes continuously for 2 days  apply lubricating eye drops to both eyes every 1 hour while awake  keep bra and abdominal binder in place until f/u, may sponge bath. Hand held shower waist down only. keep cold compresses to both eyes continuously for 2 days  apply lubricating eye drops to both eyes every 1 hour while awake  keep bra and abdominal binder in place until f/u, may sponge bath. Hand held shower waist down only.  Keep head of bed elevated at least 45 degrees

## 2019-09-10 NOTE — ASU DISCHARGE PLAN (ADULT/PEDIATRIC) - CALL YOUR DOCTOR IF YOU HAVE ANY OF THE FOLLOWING:
Nausea and vomiting that does not stop/Pain not relieved by Medications/Bleeding that does not stop/Fever greater than (need to indicate Fahrenheit or Celsius)/Swelling that gets worse Wound/Surgical Site with redness, or foul smelling discharge or pus/Nausea and vomiting that does not stop/Pain not relieved by Medications/Swelling that gets worse/Fever greater than (need to indicate Fahrenheit or Celsius)/Bleeding that does not stop Bleeding that does not stop/Swelling that gets worse/Fever greater than (need to indicate Fahrenheit or Celsius)/Pain not relieved by Medications/Nausea and vomiting that does not stop/Unable to urinate/Wound/Surgical Site with redness, or foul smelling discharge or pus

## 2020-01-05 ENCOUNTER — RESULT REVIEW (OUTPATIENT)
Age: 50
End: 2020-01-05

## 2020-07-21 PROBLEM — I95.9 HYPOTENSION, UNSPECIFIED: Chronic | Status: ACTIVE | Noted: 2018-03-27

## 2020-08-05 ENCOUNTER — APPOINTMENT (OUTPATIENT)
Dept: SURGERY | Facility: CLINIC | Age: 50
End: 2020-08-05
Payer: COMMERCIAL

## 2020-08-05 PROCEDURE — 99213K: CUSTOM

## 2020-09-04 NOTE — ASU DISCHARGE PLAN (ADULT/PEDIATRIC). - DISCHARGE PLAN IS COMPLETE AND GIVEN TO PATIENT
I was present during the key portion of the procedure. I was present during the key portion of the procedure. : Yes

## 2021-01-19 ENCOUNTER — RESULT REVIEW (OUTPATIENT)
Age: 51
End: 2021-01-19

## 2021-08-11 ENCOUNTER — APPOINTMENT (OUTPATIENT)
Dept: SURGERY | Facility: CLINIC | Age: 51
End: 2021-08-11
Payer: COMMERCIAL

## 2021-08-11 PROCEDURE — 99213K: CUSTOM

## 2021-10-20 NOTE — ASU PATIENT PROFILE, ADULT - NS PRO LACT YNNA
no
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

## 2022-01-25 ENCOUNTER — RESULT REVIEW (OUTPATIENT)
Age: 52
End: 2022-01-25

## 2022-05-18 NOTE — ASU PREOP CHECKLIST - NS PREOP CHK MONITOR ANESTHESIA CONSENT
Chief Complaint  Hip Pain (F/u right hip. Post-op 1 year.)      History of Present Illness:  Sandra Turner is a pleasant 62 y.o. female is 1 year post right hip arthroscopy pincer decompression and labral repair. She has been doing great. However in the past 2 weeks has been having bilateral lateral sided hip pain, with no inciting incident. She believes its been due to heavy lifting at work. She works as an [employer]. The pain wakes her up at night when she rolls on either hip. No numbness or tingling. No bowel or bladder symptoms. Medical History:  Patient's medications, allergies, past medical, surgical, social and family histories were reviewed and updated as appropriate. Pain Assessment  Location of Pain: Other (Comment) (Hip)  Location Modifiers: Right  Severity of Pain: 0  Quality of Pain: Other (Comment) (N/A)  Duration of Pain: A few minutes  Frequency of Pain: Rarely  Aggravating Factors: Other (Comment) (N/A)  Limiting Behavior: No  Relieving Factors: Rest  Result of Injury: No  Work-Related Injury: No  Are there other pain locations you wish to document?: No  ROS: Review of systems reviewed from Patient History Form completed today and available in the patient's chart under the Media tab. Pertinent items are noted in HPI  Review of systems reviewed from Patient History Form completed today and available in the patient's chart under the Media tab. Vital Signs:  Resp 12   Ht 5' 1\" (1.549 m)   Wt 175 lb (79.4 kg)   BMI 33.07 kg/m²         Neuro: Alert & oriented x 3,  normal,  no focal deficits noted. Normal affect. Eyes: sclera clear  Ears: Normal external ear  Mouth:  No perioral lesions  Pulm: Respirations unlabored and regular  Pulse: Extremities well perfused. 2+ peripheral pulses. Skin: Warm. No ulcerations. Constitutional: The physical examination finds the patient to be well-developed and well-nourished.   The patient is alert and oriented x3 and was cooperative throughout the visit. Hip Examination: bilateral    Skin/Inspection: No skin lesions, cellulitis, or extreme edema in the lower extremities. Standing/Walking: normal gait, negative Trendelenburg sign. Supine/Side Lying Exam: Non tender around the major bony prominences  full range of motion  FADIR Negative  CIARA Negative  Resisted Abduction 5/5   Resisted Adduction 5/5   Resisted  Flexion 5/5  severe tender at greater trochanters bilaterally    Distal Neurovascular exam is intact (foot sensation, pulses, and motor exam)       Diagnostics:   No new imaging was obtained today as the xray machine was not functioning. Assessment: Patient is a 62 y.o. female who is 1 year post right hip arthroscopy for pincer FORTINO and labral repair. She has made terrific functional gains. However she is now having signs and symptoms of bilateral greater trochanteric pain syndrome for the past 2 weeks. I do not suspect a full-thickness abductor tear. Impression:  Visit Diagnoses       Codes    Status post hip surgery    -  Primary Z98.890    Hip pain     M25.559          Office Procedures:  No orders of the defined types were placed in this encounter. No orders of the defined types were placed in this encounter. Plan:  Pertinent imaging was reviewed. The etiology, natural history, and treatment options for the disorder were discussed. The roles of activity modification, antiinflammatory medicine, injections, bracing, physical therapy, and surgical interventions were all described to the patient and questions were answered. We believe patient is a candidate for activity modification, topical Voltaren anti-inflammatory cream, and symptom surveillance. We recommend that She continuing with home exercise program for advanced o progression of motion, dynamic loading, and trunk/hip/core/thigh strengthening. All the patient's questions were answered while in the clinic.   The patient is done

## 2022-07-13 NOTE — H&P PST ADULT - SKIN
[facility]  COVID-19 Asymptomatic Exposure Testing    07/13/22    Dear Hanna Herndon    If you have not already picked up your testing kits, bring a copy of this letter and your work ID badge with you to  your supplies.    You have reported that you have had an exposure to someone with COVID-19, and that you are not having any symptoms. In doing so, you will be required to have one (2) tests that should be done immediately, the second one 5-7 days later.     You must display this letter to receive your 2 tests: Follow directions below on how to collect and schedule a drop off your specimen.    You have been set up with COVID-19 PCR testing due to a self-identified exposure to the COVID-19 virus. To help maintain a healthy workplace environment, and abide by our SafeCare promise, you will need to complete the testing below to continue working. This exposure testing is only for asymptomatic (no current symptoms) testing. You may continue to work during this process if you remain symptom free.  • If at ANY time you develop new or worsening symptoms consistent with a COVID-19 infection, please notify your leader, remove yourself from work immediately, fill out a SafeCheck tori with symptoms, or an online Exposure Evaluation Tool, for immediate antigen testing.   • If at any time during this testing process you have a positive test result, notify your leader, remove yourself from work immediately, and contact Employee Health. Employee Health will send communication through email, your [employer] tori, or online [employer] account.      location: Asymptomatic Exposure Test Kit  Locations    Team Member to do List:  • Perform FIRST PCR test immediately (results will automatically be reported to Employee Health)  • Perform SECOND PCR test 5-7 days after your first test (results will automatically be reported to Employee Health)   • Self-monitor your symptoms for 14 days from the day we  received the alert  • Be sure to review this informational how-to guide (English  Lao) to properly complete your test   • Remember, you must submit your sample within 48 hours of collection   • You must schedule a drop-off appointment at one of these locations within your [employer] account, before submitting your test  • How to make your appointment, directions here (if prompted for a password use the password [email or web address])  • Adhere to vigilant use of personal protective equipment (PPE) and social distancing in break rooms, nursing stations, offices, etc.    Test Collection Instructions:  • Nasal Swab Collection Instructions: English  • Nasal Swab Collection Instructions:  Lao  • Hologic Test Fact Sheet for Patients  • Hologic Test Kit Instructions    Symptoms of COVID-19 Infection   • Fever or Chills  • Cough  • Shortness of breath or difficulty breathing  • Exhaustion, weakness  • Muscle or body aches  • Headache  • New loss of taste or smell  • Sore throat  • Congestion or runny nose   • Nausea or vomiting  • Diarrhea    [facility]  Email: [email or web address]  Hotline: [phone]    CC: Manager     detailed exam warm and dry/color normal

## 2022-07-26 ENCOUNTER — APPOINTMENT (OUTPATIENT)
Dept: OBGYN | Facility: CLINIC | Age: 52
End: 2022-07-26

## 2022-07-26 PROCEDURE — 76856 US EXAM PELVIC COMPLETE: CPT | Mod: 59

## 2022-07-26 PROCEDURE — 76830 TRANSVAGINAL US NON-OB: CPT

## 2022-07-26 PROCEDURE — 99213 OFFICE O/P EST LOW 20 MIN: CPT | Mod: 25

## 2022-08-08 ENCOUNTER — APPOINTMENT (OUTPATIENT)
Dept: SURGERY | Facility: CLINIC | Age: 52
End: 2022-08-08

## 2022-08-08 PROCEDURE — 99213K: CUSTOM

## 2022-09-01 NOTE — ASU PATIENT PROFILE, ADULT - IS PATIENT PREGNANT?
Patient received radiation treatment number 3 of 4 to the LT shoulder, LT pelvis, and RT iliac today.   A Zhong  RT(T) no

## 2022-10-20 NOTE — ASU PATIENT PROFILE, ADULT - TEACHING/LEARNING DEVELOPMENTAL CONSIDERATIONS
Patient remains rate controlled on Coreg 6.25 twice daily.  In addition continue with Pradaxa 5 twice daily for CVA prevention   none

## 2022-10-21 NOTE — H&P PST ADULT - NEGATIVE GENERAL GENITOURINARY SYMPTOMS
no dysuria/no incontinence/no hematuria/no bladder infections/no flank pain R/no renal colic/no flank pain L n/a

## 2023-02-02 ENCOUNTER — APPOINTMENT (OUTPATIENT)
Dept: OBGYN | Facility: CLINIC | Age: 53
End: 2023-02-02
Payer: COMMERCIAL

## 2023-02-02 PROCEDURE — 82270 OCCULT BLOOD FECES: CPT

## 2023-02-02 PROCEDURE — 76830 TRANSVAGINAL US NON-OB: CPT

## 2023-02-02 PROCEDURE — 99396 PREV VISIT EST AGE 40-64: CPT | Mod: 25

## 2023-02-02 PROCEDURE — 81002 URINALYSIS NONAUTO W/O SCOPE: CPT

## 2023-02-02 PROCEDURE — 76856 US EXAM PELVIC COMPLETE: CPT | Mod: 59

## 2023-08-22 ENCOUNTER — APPOINTMENT (OUTPATIENT)
Dept: OBGYN | Facility: CLINIC | Age: 53
End: 2023-08-22
Payer: COMMERCIAL

## 2023-08-22 PROCEDURE — 76830 TRANSVAGINAL US NON-OB: CPT

## 2023-08-22 PROCEDURE — 99213 OFFICE O/P EST LOW 20 MIN: CPT

## 2023-08-28 ENCOUNTER — APPOINTMENT (OUTPATIENT)
Dept: SURGERY | Facility: CLINIC | Age: 53
End: 2023-08-28
Payer: COMMERCIAL

## 2023-08-28 PROCEDURE — 99213K: CUSTOM

## 2024-01-01 NOTE — DISCHARGE NOTE ADULT - LAUNCH MEDICATION RECONCILIATION
<<-----Click here for Discharge Medication Review No, the patient is not being discharged from [facility] No, the patient is not being discharged from [facility] No, the patient is not being discharged from [facility]

## 2024-02-26 ENCOUNTER — APPOINTMENT (OUTPATIENT)
Dept: OBGYN | Facility: CLINIC | Age: 54
End: 2024-02-26
Payer: COMMERCIAL

## 2024-02-26 PROCEDURE — 99396 PREV VISIT EST AGE 40-64: CPT | Mod: 25

## 2024-02-26 PROCEDURE — 99459 PELVIC EXAMINATION: CPT

## 2024-02-26 PROCEDURE — 76830 TRANSVAGINAL US NON-OB: CPT

## 2024-02-26 PROCEDURE — 81002 URINALYSIS NONAUTO W/O SCOPE: CPT

## 2024-02-26 PROCEDURE — 82270 OCCULT BLOOD FECES: CPT

## 2024-03-19 ENCOUNTER — RESULT REVIEW (OUTPATIENT)
Age: 54
End: 2024-03-19

## 2024-03-19 ENCOUNTER — APPOINTMENT (OUTPATIENT)
Dept: ULTRASOUND IMAGING | Facility: CLINIC | Age: 54
End: 2024-03-19
Payer: COMMERCIAL

## 2024-03-19 ENCOUNTER — OUTPATIENT (OUTPATIENT)
Dept: OUTPATIENT SERVICES | Facility: HOSPITAL | Age: 54
LOS: 1 days | End: 2024-03-19
Payer: COMMERCIAL

## 2024-03-19 DIAGNOSIS — N92.6 IRREGULAR MENSTRUATION, UNSPECIFIED: Chronic | ICD-10-CM

## 2024-03-19 DIAGNOSIS — Z98.82 BREAST IMPLANT STATUS: Chronic | ICD-10-CM

## 2024-03-19 DIAGNOSIS — Z90.710 ACQUIRED ABSENCE OF BOTH CERVIX AND UTERUS: Chronic | ICD-10-CM

## 2024-03-19 DIAGNOSIS — Z90.13 ACQUIRED ABSENCE OF BILATERAL BREASTS AND NIPPLES: Chronic | ICD-10-CM

## 2024-03-19 DIAGNOSIS — Z98.89 OTHER SPECIFIED POSTPROCEDURAL STATES: Chronic | ICD-10-CM

## 2024-03-19 DIAGNOSIS — Z00.8 ENCOUNTER FOR OTHER GENERAL EXAMINATION: ICD-10-CM

## 2024-03-19 PROCEDURE — 76641 ULTRASOUND BREAST COMPLETE: CPT | Mod: 26,50

## 2024-03-19 PROCEDURE — 76641 ULTRASOUND BREAST COMPLETE: CPT

## 2024-03-29 ENCOUNTER — OUTPATIENT (OUTPATIENT)
Dept: OUTPATIENT SERVICES | Facility: HOSPITAL | Age: 54
LOS: 1 days | End: 2024-03-29
Payer: COMMERCIAL

## 2024-03-29 ENCOUNTER — APPOINTMENT (OUTPATIENT)
Dept: MRI IMAGING | Facility: CLINIC | Age: 54
End: 2024-03-29
Payer: COMMERCIAL

## 2024-03-29 ENCOUNTER — RESULT REVIEW (OUTPATIENT)
Age: 54
End: 2024-03-29

## 2024-03-29 DIAGNOSIS — Z90.710 ACQUIRED ABSENCE OF BOTH CERVIX AND UTERUS: Chronic | ICD-10-CM

## 2024-03-29 DIAGNOSIS — Z00.8 ENCOUNTER FOR OTHER GENERAL EXAMINATION: ICD-10-CM

## 2024-03-29 DIAGNOSIS — Z98.82 BREAST IMPLANT STATUS: Chronic | ICD-10-CM

## 2024-03-29 DIAGNOSIS — Z90.13 ACQUIRED ABSENCE OF BILATERAL BREASTS AND NIPPLES: Chronic | ICD-10-CM

## 2024-03-29 DIAGNOSIS — Z98.89 OTHER SPECIFIED POSTPROCEDURAL STATES: Chronic | ICD-10-CM

## 2024-03-29 DIAGNOSIS — N92.6 IRREGULAR MENSTRUATION, UNSPECIFIED: Chronic | ICD-10-CM

## 2024-03-29 PROCEDURE — 77049 MRI BREAST C-+ W/CAD BI: CPT | Mod: 26

## 2024-03-29 PROCEDURE — C8908: CPT

## 2024-03-29 PROCEDURE — A9585: CPT

## 2024-08-14 ENCOUNTER — APPOINTMENT (OUTPATIENT)
Dept: SURGERY | Facility: CLINIC | Age: 54
End: 2024-08-14
Payer: COMMERCIAL

## 2024-08-14 PROCEDURE — 99213K: CUSTOM

## 2025-03-06 ENCOUNTER — APPOINTMENT (OUTPATIENT)
Dept: OBGYN | Facility: CLINIC | Age: 55
End: 2025-03-06
Payer: COMMERCIAL

## 2025-03-06 PROCEDURE — 99459 PELVIC EXAMINATION: CPT

## 2025-03-06 PROCEDURE — 99396 PREV VISIT EST AGE 40-64: CPT | Mod: 25

## 2025-03-06 PROCEDURE — 76830 TRANSVAGINAL US NON-OB: CPT

## 2025-03-06 PROCEDURE — 81002 URINALYSIS NONAUTO W/O SCOPE: CPT

## 2025-03-06 PROCEDURE — 82270 OCCULT BLOOD FECES: CPT

## 2025-07-13 NOTE — H&P PST ADULT - LAST CARDIAC ANGIOGRAM/IMAGING
[facility] [place] [id number] [place] [address] [place] [address]  Phone: [phone]    Lacie Rivera was seen and treated in our emergency department on 7/13/2025.  She may return to work on 07/15/2025.         Thank you for choosing [facility].    Christi Lowe MD      
[facility] [place] [address] [place] [address] [place] [address]  Phone: [phone]    Lacie Rivera was seen and treated in our emergency department on 7/13/2025.  She may return to work on 07/15/2025.         Thank you for choosing [facility].    Christi Lowe MD      
denies

## 2025-08-04 ENCOUNTER — APPOINTMENT (OUTPATIENT)
Dept: SURGERY | Facility: CLINIC | Age: 55
End: 2025-08-04
Payer: COMMERCIAL

## 2025-08-04 PROCEDURE — 99213K: CUSTOM
